# Patient Record
Sex: FEMALE | Race: WHITE | NOT HISPANIC OR LATINO | Employment: OTHER | URBAN - METROPOLITAN AREA
[De-identification: names, ages, dates, MRNs, and addresses within clinical notes are randomized per-mention and may not be internally consistent; named-entity substitution may affect disease eponyms.]

---

## 2017-02-15 ENCOUNTER — TELEPHONE (OUTPATIENT)
Dept: OBSTETRICS AND GYNECOLOGY | Facility: CLINIC | Age: 57
End: 2017-02-15

## 2018-06-14 ENCOUNTER — OFFICE VISIT (OUTPATIENT)
Dept: OBSTETRICS AND GYNECOLOGY | Facility: CLINIC | Age: 58
End: 2018-06-14
Payer: COMMERCIAL

## 2018-06-14 VITALS
WEIGHT: 137.44 LBS | BODY MASS INDEX: 25.95 KG/M2 | DIASTOLIC BLOOD PRESSURE: 84 MMHG | SYSTOLIC BLOOD PRESSURE: 126 MMHG | HEIGHT: 61 IN

## 2018-06-14 DIAGNOSIS — Z01.419 ENCOUNTER FOR GYNECOLOGICAL EXAMINATION: Primary | ICD-10-CM

## 2018-06-14 PROCEDURE — 99999 PR PBB SHADOW E&M-EST. PATIENT-LVL II: CPT | Mod: PBBFAC,,, | Performed by: OBSTETRICS & GYNECOLOGY

## 2018-06-14 PROCEDURE — 99396 PREV VISIT EST AGE 40-64: CPT | Mod: S$GLB,,, | Performed by: OBSTETRICS & GYNECOLOGY

## 2018-06-14 NOTE — PROGRESS NOTES
Subjective:       Patient ID: Franca Calhoun is a 57 y.o. female.    Chief Complaint:  Well Woman (mammo 2017 bmd 2017 )      History of Present Illness  - here for annual. C/o leaking a little urine sometimes. Waits many hours to void. Doesn't wake at night. Tries to remember to do Kegel exercises.    Past Medical History:   Diagnosis Date    Osteoporosis        Past Surgical History:   Procedure Laterality Date    HYSTERECTOMY      vaginal hysterectomy         Current Outpatient Prescriptions:     azelastine (ASTELIN) 137 mcg (0.1 %) nasal spray, PLACE 2 SPRAYS INTRANASALLY TWICE DAILY, Disp: , Rfl: 5    busPIRone (BUSPAR) 10 MG tablet, once a day, Disp: , Rfl:     Ca-soy-blk cohos-timothy-tea-caf (ESTROPLUS MENOPAUSE FORMULA) 90-60 mg Tab, once a day, Disp: , Rfl:     cyanocobalamin (VITAMIN B-12) 100 MCG tablet, once a day, Disp: , Rfl:     law primrose/linoleic/gamoleni (PRIMROSE OIL ORAL), Take by mouth., Disp: , Rfl:     glucosamine-chondroit-vit C-Mn (GLUCOSAMINE CHONDROITIN MAXSTR) 500-400 mg Cap, once a day, Disp: , Rfl:     ipratropium (ATROVENT) 0.06 % nasal spray, 2 SQUIRTS INTO EACH NOSTRIL EVERY 12 HOURS, Disp: , Rfl: 5    multivit-minerals-ferrous fum (MULTI VITAMIN) 9 mg iron/15 mL Liqd, once a day, Disp: , Rfl:     biotin 5 mg Tab, once a day, Disp: , Rfl:     Review of patient's allergies indicates:  No Known Allergies    GYN & OB History  No LMP recorded. Patient has had a hysterectomy.   Date of Last Pap: No result found    OB History    Para Term  AB Living   2 2 2     2   SAB TAB Ectopic Multiple Live Births           2      # Outcome Date GA Lbr Shailesh/2nd Weight Sex Delivery Anes PTL Lv   2 Term 80 40w0d   F Vag-Spont   APOLINAR   1 Term 75 40w0d   M Vag-Spont   APOLINAR          Social History     Social History    Marital status:      Spouse name: N/A    Number of children: N/A    Years of education: N/A     Occupational History    Not on file.  "    Social History Main Topics    Smoking status: Never Smoker    Smokeless tobacco: Never Used    Alcohol use Yes      Comment: rare    Drug use: No    Sexual activity: Yes     Partners: Male     Birth control/ protection: See Surgical Hx     Other Topics Concern    Not on file     Social History Narrative    No narrative on file       Family History   Problem Relation Age of Onset    Diabetes Father     Hypertension Sister     Prostate cancer Paternal Uncle     Hypertension Sister     Breast cancer Neg Hx     Ovarian cancer Neg Hx        Review of Systems  Review of Systems   Respiratory: Negative for shortness of breath.    Cardiovascular: Negative for chest pain and palpitations.   Gastrointestinal: Negative for blood in stool, nausea and vomiting.   Genitourinary:        - see HPI   Skin: Negative for rash and wound.   Allergic/Immunologic: Negative for immunocompromised state.   Neurological: Negative for dizziness and syncope.   Hematological: Negative for adenopathy.   Psychiatric/Behavioral: Negative for behavioral problems.        Objective:     Vitals:    06/14/18 1523   BP: 126/84   Weight: 62.4 kg (137 lb 7.3 oz)   Height: 5' 1" (1.549 m)       Physical Exam:   Constitutional: She is oriented to person, place, and time. She appears well-developed and well-nourished.        Pulmonary/Chest: Right breast exhibits no mass, no nipple discharge, no skin change, no tenderness and no swelling. Left breast exhibits no mass, no nipple discharge, no skin change, no tenderness and no swelling. Breasts are symmetrical.        Abdominal: Soft. She exhibits no distension. There is no tenderness.     Genitourinary: Vagina normal. There is no tenderness or lesion on the right labia. There is no tenderness or lesion on the left labia. Uterus is absent. Right adnexum displays no mass, no tenderness and no fullness. Left adnexum displays no mass, no tenderness and no fullness. No vaginal discharge found. " Cervix exhibits absence.           Musculoskeletal: Moves all extremeties.       Neurological: She is alert and oriented to person, place, and time.     Psychiatric: She has a normal mood and affect.        Assessment/ Plan:          Franca was seen today for well woman.    Diagnoses and all orders for this visit:    Encounter for gynecological examination      - discussed bladder training and double voiding. Likely has poor bladder tone from not voiding often enough. Continue Kegel exercises.    Follow-up in about 1 year (around 6/14/2019) for annual exam.

## 2019-07-26 ENCOUNTER — OFFICE VISIT (OUTPATIENT)
Dept: OBSTETRICS AND GYNECOLOGY | Facility: CLINIC | Age: 59
End: 2019-07-26
Payer: COMMERCIAL

## 2019-07-26 VITALS
SYSTOLIC BLOOD PRESSURE: 110 MMHG | DIASTOLIC BLOOD PRESSURE: 78 MMHG | WEIGHT: 136.69 LBS | BODY MASS INDEX: 25.81 KG/M2 | HEIGHT: 61 IN

## 2019-07-26 DIAGNOSIS — M85.80 OSTEOPENIA, UNSPECIFIED LOCATION: ICD-10-CM

## 2019-07-26 DIAGNOSIS — Z01.419 ENCOUNTER FOR GYNECOLOGICAL EXAMINATION: Primary | ICD-10-CM

## 2019-07-26 DIAGNOSIS — Z12.39 SCREENING FOR BREAST CANCER: ICD-10-CM

## 2019-07-26 PROCEDURE — 99396 PR PREVENTIVE VISIT,EST,40-64: ICD-10-PCS | Mod: S$GLB,,, | Performed by: OBSTETRICS & GYNECOLOGY

## 2019-07-26 PROCEDURE — 99999 PR PBB SHADOW E&M-EST. PATIENT-LVL III: ICD-10-PCS | Mod: PBBFAC,,, | Performed by: OBSTETRICS & GYNECOLOGY

## 2019-07-26 PROCEDURE — 99396 PREV VISIT EST AGE 40-64: CPT | Mod: S$GLB,,, | Performed by: OBSTETRICS & GYNECOLOGY

## 2019-07-26 PROCEDURE — 99999 PR PBB SHADOW E&M-EST. PATIENT-LVL III: CPT | Mod: PBBFAC,,, | Performed by: OBSTETRICS & GYNECOLOGY

## 2019-07-26 RX ORDER — VALACYCLOVIR HYDROCHLORIDE 500 MG/1
500 TABLET, FILM COATED ORAL 2 TIMES DAILY
Refills: 0 | COMMUNITY
Start: 2019-04-18

## 2019-07-26 NOTE — PROGRESS NOTES
Subjective:       Patient ID: Franca Calhoun is a 58 y.o. female.    Chief Complaint:  Well Woman (last vag. pap 2006, Negative  --  last mmg 1-2-19, negative  ([facility]) --  colonoscopy  , Normal (cologard 2019, normal) )      History of Present Illness  - here for annual. Brought labs from PCP including hormone labs. Felt like hot flashes waned then have returned. Has gained some weight. Chases after grandkids. 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    Past Medical History:   Diagnosis Date    Anxiety     Fever blister     H/O mammogram 2019    Normal  ([facility])    Osteoporosis        Past Surgical History:   Procedure Laterality Date    COLONOSCOPY      Normal  (Cologard 2019, Normal)     HYSTERECTOMY      TVH, OVARIES REMAIN         Current Outpatient Medications:     azelastine (ASTELIN) 137 mcg (0.1 %) nasal spray, PLACE 2 SPRAYS INTRANASALLY TWICE DAILY, Disp: , Rfl: 5    B3-azelaic-zinc-B6-copper--5-10-5-1.5 mg Tab, Take by mouth., Disp: , Rfl:     busPIRone (BUSPAR) 10 MG tablet, once a day, Disp: , Rfl:     cyanocobalamin (VITAMIN B-12) 100 MCG tablet, once a day, Disp: , Rfl:     law primrose/linoleic/gamoleni (PRIMROSE OIL ORAL), Take by mouth., Disp: , Rfl:     glucosamine-chondroit-vit C-Mn (GLUCOSAMINE CHONDROITIN MAXSTR) 500-400 mg Cap, once a day, Disp: , Rfl:     ipratropium (ATROVENT) 0.06 % nasal spray, 2 SQUIRTS INTO EACH NOSTRIL EVERY 12 HOURS, Disp: , Rfl: 5    multivit-minerals-ferrous fum (MULTI VITAMIN) 9 mg iron/15 mL Liqd, once a day, Disp: , Rfl:     valACYclovir (VALTREX) 500 MG tablet, Take 500 mg by mouth 2 (two) times daily., Disp: , Rfl: 0    Review of patient's allergies indicates:   Allergen Reactions    Meperidine Nausea And Vomiting       GYN & OB History  No LMP recorded (lmp unknown). Patient has had a hysterectomy.   Date of Last Pap: No result found    OB History    Para Term  AB Living   2 2 2     2    SAB TAB Ectopic Multiple Live Births           2      # Outcome Date GA Lbr Shailesh/2nd Weight Sex Delivery Anes PTL Lv   2 Term 03/16/82 40w0d  3.572 kg (7 lb 14 oz) F Vag-Spont   APOLINAR   1 Term 01/14/77 40w0d  3.629 kg (8 lb) M Vag-Spont   APOLINAR       Social History     Socioeconomic History    Marital status:      Spouse name: Not on file    Number of children: Not on file    Years of education: Not on file    Highest education level: Not on file   Occupational History    Not on file   Social Needs    Financial resource strain: Not on file    Food insecurity:     Worry: Not on file     Inability: Not on file    Transportation needs:     Medical: Not on file     Non-medical: Not on file   Tobacco Use    Smoking status: Never Smoker    Smokeless tobacco: Never Used   Substance and Sexual Activity    Alcohol use: Yes     Comment: Social     Drug use: No    Sexual activity: Yes     Partners: Male     Birth control/protection: Surgical     Comment: :     [facility]  2006   Lifestyle    Physical activity:     Days per week: Not on file     Minutes per session: Not on file    Stress: Not on file   Relationships    Social connections:     Talks on phone: Not on file     Gets together: Not on file     Attends Faith service: Not on file     Active member of club or organization: Not on file     Attends meetings of clubs or organizations: Not on file     Relationship status: Not on file   Other Topics Concern    Not on file   Social History Narrative    Not on file       Family History   Problem Relation Age of Onset    Diabetes Father     Hypertension Sister     Prostate cancer Paternal Uncle     Hypertension Sister     Breast cancer Neg Hx     Ovarian cancer Neg Hx     Colon cancer Neg Hx        Review of Systems  Review of Systems   Respiratory: Negative for shortness of breath.    Cardiovascular: Negative for chest pain and palpitations.   Gastrointestinal: Negative for blood in stool,  "nausea and vomiting.   Genitourinary:        - see HPI   Skin: Negative for rash and wound.   Allergic/Immunologic: Negative for immunocompromised state.   Neurological: Negative for dizziness and syncope.   Hematological: Negative for adenopathy.   Psychiatric/Behavioral: Negative for behavioral problems.        Objective:     Vitals:    07/26/19 1058   BP: 110/78   Weight: 62 kg (136 lb 11 oz)   Height: 5' 1" (1.549 m)       Physical Exam:   Constitutional: She is oriented to person, place, and time. She appears well-developed and well-nourished.        Pulmonary/Chest: Right breast exhibits no mass, no nipple discharge, no skin change, no tenderness and no swelling. Left breast exhibits no mass, no nipple discharge, no skin change, no tenderness and no swelling. Breasts are symmetrical.        Abdominal: Soft. She exhibits no distension. There is no tenderness.     Genitourinary: Vagina normal. There is no tenderness or lesion on the right labia. There is no tenderness or lesion on the left labia. Uterus is absent. Right adnexum displays no mass, no tenderness and no fullness. Left adnexum displays no mass, no tenderness and no fullness. No vaginal discharge found. Cervix exhibits absence.   Genitourinary Comments: Very atrophic           Musculoskeletal: Moves all extremeties.       Neurological: She is alert and oriented to person, place, and time.     Psychiatric: She has a normal mood and affect.        Assessment/ Plan:     Orders Placed This Encounter    Mammo Digital Screening Bilat w/ [facility]    DXA Bone Density Spine And Hip       Franca was seen today for well woman.    Diagnoses and all orders for this visit:    Encounter for gynecological examination    Screening for breast cancer  -     Mammo Digital Screening Bilat w/ [facility]; Future  -     Mammo Digital Screening Bilat w/ [facility]    Osteopenia, unspecified location  -     DXA Bone Density Spine And Hip; Future  -     DXA Bone Density Spine And Hip    - " discussed how there can be causes of hot flashes that aren't hormonal.  - discussed weight loss strategies.  lost 45 lbs on keto and has plateaued. Patient doesn't think keto is healthy and doesn't want to try it. Discussed WW; patient reports has had some success with it in the past and will likely try it again.    Follow up in about 1 year (around 7/26/2020) for annual exam.

## 2019-08-29 DIAGNOSIS — M81.0 OSTEOPOROSIS, UNSPECIFIED OSTEOPOROSIS TYPE, UNSPECIFIED PATHOLOGICAL FRACTURE PRESENCE: Primary | ICD-10-CM

## 2019-08-29 NOTE — TELEPHONE ENCOUNTER
Spoke with patient. Reviewed BMD: spine and hip have worsened. Hip is now in osteoporosis range and spine is in severe osteopenia range. Took Actonel in the past but stopped when had to have a tooth extracted and an implant placed. Discussed trying Prolia. Will order and check insurance.

## 2019-08-30 ENCOUNTER — TELEPHONE (OUTPATIENT)
Dept: PHARMACY | Facility: CLINIC | Age: 59
End: 2019-08-30

## 2019-08-30 NOTE — TELEPHONE ENCOUNTER
LVM for callback to inform patient that [facility] received prescription for Prolia and prior authorization is required.  [facility] will be back in touch once insurance determination is received.

## 2019-09-04 ENCOUNTER — TELEPHONE (OUTPATIENT)
Dept: OBSTETRICS AND GYNECOLOGY | Facility: CLINIC | Age: 59
End: 2019-09-04

## 2019-09-04 DIAGNOSIS — M81.0 OSTEOPOROSIS, UNSPECIFIED OSTEOPOROSIS TYPE, UNSPECIFIED PATHOLOGICAL FRACTURE PRESENCE: Primary | ICD-10-CM

## 2019-09-04 NOTE — TELEPHONE ENCOUNTER
----- Message from Guerda Grant sent at 9/4/2019  1:31 PM CDT -----  Regarding: Prolia  Good afternoon,   [facility] received prescription for Prolia 60 mg/mL. Upon calling the patients insurance company, we have been told that this medication is not covered under the patients pharmacy benefits. This medication is available from your normal clinic buy and bill procedure for the facility administered medication. [facility] is unable to bill medical claims for medications. Please contact [facility] with any questions at [phone]. If you have any questions or concerns please contact us at [facility].     Thank you,   Guerda Grant CPhT  [facility]   Phone: [phone]  Fax:[phone]  [phone]

## 2019-09-19 ENCOUNTER — TELEPHONE (OUTPATIENT)
Dept: OBSTETRICS AND GYNECOLOGY | Facility: CLINIC | Age: 59
End: 2019-09-19

## 2019-09-19 DIAGNOSIS — M81.0 OSTEOPOROSIS, UNSPECIFIED OSTEOPOROSIS TYPE, UNSPECIFIED PATHOLOGICAL FRACTURE PRESENCE: Primary | ICD-10-CM

## 2019-09-19 NOTE — TELEPHONE ENCOUNTER
prolia covered but subject to a $55.00 copay. PA is required. Please get pa and I will contact pt to schedule. [phone] [phone]

## 2019-09-20 NOTE — TELEPHONE ENCOUNTER
Spoke with [employer] and Rosalba is approved from 9/23/2019-9/23/2020 buy and bill in office.  Auth # [id number].  Pt needs labs before injection.  Orders placed.

## 2019-09-23 ENCOUNTER — TELEPHONE (OUTPATIENT)
Dept: OBSTETRICS AND GYNECOLOGY | Facility: CLINIC | Age: 59
End: 2019-09-23

## 2019-09-23 DIAGNOSIS — M81.0 OSTEOPOROSIS, UNSPECIFIED OSTEOPOROSIS TYPE, UNSPECIFIED PATHOLOGICAL FRACTURE PRESENCE: Primary | ICD-10-CM

## 2019-09-23 NOTE — TELEPHONE ENCOUNTER
Sent pt lab orders to [facility]. Pt understands we will contact her to schedule prolia once we receive results.

## 2019-09-26 ENCOUNTER — TELEPHONE (OUTPATIENT)
Dept: OBSTETRICS AND GYNECOLOGY | Facility: CLINIC | Age: 59
End: 2019-09-26

## 2019-09-26 NOTE — TELEPHONE ENCOUNTER
Pt is at [facility] now and her labs weren't sent over.Please fax # [phone] pt is waiting and please call her to let her know they have been faxed.Pt # [phone]

## 2019-10-01 ENCOUNTER — TELEPHONE (OUTPATIENT)
Dept: OBSTETRICS AND GYNECOLOGY | Facility: CLINIC | Age: 59
End: 2019-10-01

## 2019-10-01 NOTE — TELEPHONE ENCOUNTER
Dr. Matamoros pt, was calling to schedule her prolia injection. Was told when she completes labs that nurse would call to schedule. Please call pt and advise, thank you.

## 2019-10-01 NOTE — TELEPHONE ENCOUNTER
Pt had Prolia labs done at [facility] on 9/26. Results in folder    8/24 dexa done at [facility].     Pt received a letter from insurance saying there needs to be an authorization code.    Scheduled 10/10 for prolia

## 2019-10-09 ENCOUNTER — TELEPHONE (OUTPATIENT)
Dept: OBSTETRICS AND GYNECOLOGY | Facility: CLINIC | Age: 59
End: 2019-10-09

## 2019-10-09 NOTE — TELEPHONE ENCOUNTER
Dr Matamoros pt calling, has prolia shot tomorrow but never received results of blood work.Pt wants to make sure she is good to go with shot.Pt # [phone]

## 2019-10-10 ENCOUNTER — CLINICAL SUPPORT (OUTPATIENT)
Dept: OBSTETRICS AND GYNECOLOGY | Facility: CLINIC | Age: 59
End: 2019-10-10
Payer: COMMERCIAL

## 2019-10-10 DIAGNOSIS — M81.0 OSTEOPOROSIS, UNSPECIFIED OSTEOPOROSIS TYPE, UNSPECIFIED PATHOLOGICAL FRACTURE PRESENCE: Primary | ICD-10-CM

## 2019-10-10 PROCEDURE — 96372 PR INJECTION,THERAP/PROPH/DIAG2ST, IM OR SUBCUT: ICD-10-PCS | Mod: S$GLB,,, | Performed by: OBSTETRICS & GYNECOLOGY

## 2019-10-10 PROCEDURE — 96372 THER/PROPH/DIAG INJ SC/IM: CPT | Mod: S$GLB,,, | Performed by: OBSTETRICS & GYNECOLOGY

## 2019-10-10 NOTE — PROGRESS NOTES
Ordering Provider:      Patient here to receive  prolia to the LEFT arm. Tolerated well, no reaction noted. Instructed to wait 15 minutes after administration for monitoring.      Pre pain scale: none     Post pain scale: none\

## 2020-06-12 ENCOUNTER — TELEPHONE (OUTPATIENT)
Dept: OBSTETRICS AND GYNECOLOGY | Facility: CLINIC | Age: 60
End: 2020-06-12

## 2020-06-19 ENCOUNTER — TELEPHONE (OUTPATIENT)
Dept: OBSTETRICS AND GYNECOLOGY | Facility: CLINIC | Age: 60
End: 2020-06-19

## 2020-06-19 NOTE — TELEPHONE ENCOUNTER
Dr. Matamoros pt called saying that she has been dealing with hemorrhoids for a little while. Pt said now her whole vaginal area is on fire, itching, and burning. Pt asked to speak to nurse. Thanks.     Incoming call        Called pt and she has been battling hemorrhoids for the past few months.  She has been using preparation h with no improvement.     She is now experiencing outer itching with redness she thinks could be yeast also.    Appt scheduled.

## 2020-06-23 ENCOUNTER — CLINICAL SUPPORT (OUTPATIENT)
Dept: OBSTETRICS AND GYNECOLOGY | Facility: CLINIC | Age: 60
End: 2020-06-23
Payer: COMMERCIAL

## 2020-06-23 ENCOUNTER — OFFICE VISIT (OUTPATIENT)
Dept: OBSTETRICS AND GYNECOLOGY | Facility: CLINIC | Age: 60
End: 2020-06-23
Attending: OBSTETRICS & GYNECOLOGY
Payer: COMMERCIAL

## 2020-06-23 VITALS
HEIGHT: 61 IN | DIASTOLIC BLOOD PRESSURE: 84 MMHG | SYSTOLIC BLOOD PRESSURE: 132 MMHG | WEIGHT: 142.31 LBS | BODY MASS INDEX: 26.87 KG/M2

## 2020-06-23 DIAGNOSIS — N90.89 VULVAR IRRITATION: Primary | ICD-10-CM

## 2020-06-23 DIAGNOSIS — M81.0 OSTEOPOROSIS, UNSPECIFIED OSTEOPOROSIS TYPE, UNSPECIFIED PATHOLOGICAL FRACTURE PRESENCE: Primary | ICD-10-CM

## 2020-06-23 PROCEDURE — 99999 PR PBB SHADOW E&M-EST. PATIENT-LVL III: CPT | Mod: PBBFAC,,, | Performed by: OBSTETRICS & GYNECOLOGY

## 2020-06-23 PROCEDURE — 3008F PR BODY MASS INDEX (BMI) DOCUMENTED: ICD-10-PCS | Mod: CPTII,S$GLB,, | Performed by: OBSTETRICS & GYNECOLOGY

## 2020-06-23 PROCEDURE — 99213 OFFICE O/P EST LOW 20 MIN: CPT | Mod: 25,S$GLB,, | Performed by: OBSTETRICS & GYNECOLOGY

## 2020-06-23 PROCEDURE — 99999 PR PBB SHADOW E&M-EST. PATIENT-LVL I: ICD-10-PCS | Mod: PBBFAC,,,

## 2020-06-23 PROCEDURE — 96372 PR INJECTION,THERAP/PROPH/DIAG2ST, IM OR SUBCUT: ICD-10-PCS | Mod: S$GLB,,, | Performed by: OBSTETRICS & GYNECOLOGY

## 2020-06-23 PROCEDURE — 96372 THER/PROPH/DIAG INJ SC/IM: CPT | Mod: S$GLB,,, | Performed by: OBSTETRICS & GYNECOLOGY

## 2020-06-23 PROCEDURE — 3008F BODY MASS INDEX DOCD: CPT | Mod: CPTII,S$GLB,, | Performed by: OBSTETRICS & GYNECOLOGY

## 2020-06-23 PROCEDURE — 99999 PR PBB SHADOW E&M-EST. PATIENT-LVL III: ICD-10-PCS | Mod: PBBFAC,,, | Performed by: OBSTETRICS & GYNECOLOGY

## 2020-06-23 PROCEDURE — 99213 PR OFFICE/OUTPT VISIT, EST, LEVL III, 20-29 MIN: ICD-10-PCS | Mod: 25,S$GLB,, | Performed by: OBSTETRICS & GYNECOLOGY

## 2020-06-23 PROCEDURE — 99999 PR PBB SHADOW E&M-EST. PATIENT-LVL I: CPT | Mod: PBBFAC,,,

## 2020-06-23 RX ORDER — CLOTRIMAZOLE AND BETAMETHASONE DIPROPIONATE 10; .64 MG/G; MG/G
CREAM TOPICAL 2 TIMES DAILY
Qty: 45 G | Refills: 0 | Status: SHIPPED | OUTPATIENT
Start: 2020-06-23 | End: 2020-06-30

## 2020-06-23 RX ORDER — HYDROCORTISONE ACETATE PRAMOXINE HCL 2.5; 1 G/100G; G/100G
CREAM TOPICAL 2 TIMES DAILY
Qty: 30 G | Refills: 1 | Status: SHIPPED | OUTPATIENT
Start: 2020-06-23 | End: 2021-12-07 | Stop reason: SDUPTHER

## 2020-06-23 NOTE — PROGRESS NOTES
Subjective:       Patient ID: Franca Calhoun is a 59 y.o. female.    Chief Complaint:  Hemorrhoids (c/o hemorrhoids for past few months- using preperation H with no improvement- also c/o vaginal ithcing and refness )      History of Present Illness  - patient presents with c/o vaginal itching and irritation. She has only a slight discharge. Patient reports that she scratches at night. BMs are painful; denies bleeding. She occasionally has to strain. Patient picks up her 30 lb grandchild frequently.    Past Medical History:   Diagnosis Date    Anxiety     Fever blister     H/O mammogram 01/2019    Normal  ([facility])    Osteoporosis        Past Surgical History:   Procedure Laterality Date    COLONOSCOPY  2012    Normal  (Cologard 7/2019, Normal)     HYSTERECTOMY  2006    TVH, OVARIES REMAIN         Current Outpatient Medications:     azelastine (ASTELIN) 137 mcg (0.1 %) nasal spray, PLACE 2 SPRAYS INTRANASALLY TWICE DAILY, Disp: , Rfl: 5    B3-azelaic-zinc-B6-copper--5-10-5-1.5 mg Tab, Take by mouth., Disp: , Rfl:     busPIRone (BUSPAR) 10 MG tablet, once a day, Disp: , Rfl:     cetirizine HCl (ZYRTEC ORAL), Take by mouth., Disp: , Rfl:     cyanocobalamin (VITAMIN B-12) 100 MCG tablet, once a day, Disp: , Rfl:     denosumab (PROLIA) 60 mg/mL Syrg, Inject 1 mL (60 mg total) into the skin every 6 (six) months., Disp: 2 mL, Rfl: 0    ergocalciferol, vitamin D2, (VITAMIN D ORAL), Take by mouth., Disp: , Rfl:     law primrose/linoleic/gamoleni (PRIMROSE OIL ORAL), Take by mouth., Disp: , Rfl:     glucosamine-chondroit-vit C-Mn (GLUCOSAMINE CHONDROITIN MAXSTR) 500-400 mg Cap, once a day, Disp: , Rfl:     ipratropium (ATROVENT) 0.06 % nasal spray, 2 SQUIRTS INTO EACH NOSTRIL EVERY 12 HOURS, Disp: , Rfl: 5    multivit-minerals-ferrous fum (MULTI VITAMIN) 9 mg iron/15 mL Liqd, once a day, Disp: , Rfl:     valACYclovir (VALTREX) 500 MG tablet, Take 500 mg by mouth 2 (two) times daily., Disp: , Rfl: 0     clotrimazole-betamethasone 1-0.05% (LOTRISONE) cream, Apply topically 2 (two) times daily. for 7 days, Disp: 45 g, Rfl: 0    hydrocortisone-pramoxine (ANALPRAM-HC) 2.5-1 % Crea, Place rectally 2 (two) times daily., Disp: 30 g, Rfl: 1  No current facility-administered medications for this visit.     Review of patient's allergies indicates:   Allergen Reactions    Meperidine Nausea And Vomiting       GYN & OB History  No LMP recorded (lmp unknown). Patient has had a hysterectomy.   Date of Last Pap: No result found    OB History    Para Term  AB Living   2 2 2     2   SAB TAB Ectopic Multiple Live Births           2      # Outcome Date GA Lbr Shailesh/2nd Weight Sex Delivery Anes PTL Lv   2 Term 80 40w0d  3.572 kg (7 lb 14 oz) F Vag-Spont   APOLINAR   1 Term 75 40w0d  3.629 kg (8 lb) M Vag-Spont   APOLINAR       Social History     Socioeconomic History    Marital status:      Spouse name: Not on file    Number of children: Not on file    Years of education: Not on file    Highest education level: Not on file   Occupational History    Not on file   Social Needs    Financial resource strain: Not on file    Food insecurity     Worry: Not on file     Inability: Not on file    Transportation needs     Medical: Not on file     Non-medical: Not on file   Tobacco Use    Smoking status: Never Smoker    Smokeless tobacco: Never Used   Substance and Sexual Activity    Alcohol use: Yes     Comment: Social     Drug use: No    Sexual activity: Yes     Partners: Male     Birth control/protection: Surgical     Comment: :     [facility]     Lifestyle    Physical activity     Days per week: Not on file     Minutes per session: Not on file    Stress: Not on file   Relationships    Social connections     Talks on phone: Not on file     Gets together: Not on file     Attends Gnosticist service: Not on file     Active member of club or organization: Not on file     Attends meetings of clubs or  "organizations: Not on file     Relationship status: Not on file   Other Topics Concern    Not on file   Social History Narrative    Not on file       Family History   Problem Relation Age of Onset    Diabetes Father     Hypertension Sister     Prostate cancer Paternal Uncle     Hypertension Sister     Breast cancer Neg Hx     Ovarian cancer Neg Hx     Colon cancer Neg Hx        Review of Systems  Review of Systems   All other systems reviewed and are negative.       Objective:     Vitals:    06/23/20 1012   BP: 132/84   Weight: 64.5 kg (142 lb 4.9 oz)   Height: 5' 1" (1.549 m)       Physical Exam:   Constitutional: She appears well-developed and well-nourished. She is cooperative. No distress.             Abdominal: Soft. Normal appearance. There is no abdominal tenderness.     Genitourinary:    Vagina normal.   Rectum:      No external hemorrhoid.   There is no rash, tenderness or lesion on the right labia. There is no rash, tenderness or lesion on the left labia.    Genitourinary Comments: Erythema and excoriations.                  Neurological: She is alert.          Assessment/ Plan:     Orders Placed This Encounter    clotrimazole-betamethasone 1-0.05% (LOTRISONE) cream    hydrocortisone-pramoxine (ANALPRAM-HC) 2.5-1 % Crea       Franca was seen today for hemorrhoids.    Diagnoses and all orders for this visit:    Vulvar irritation    Other orders  -     clotrimazole-betamethasone 1-0.05% (LOTRISONE) cream; Apply topically 2 (two) times daily. for 7 days  -     hydrocortisone-pramoxine (ANALPRAM-HC) 2.5-1 % Crea; Place rectally 2 (two) times daily.    - will treat skin irritation with Lotrisone. Advised patient to try to regulate BMs and insert Analpram.    Follow up in about 3 weeks (around 7/14/2020) for follow up.    "

## 2020-06-23 NOTE — PROGRESS NOTES
Pt received Prolia injection as ordered by Dr Matamoros SQ to right arm, pt toelrated well. Pt verbalized understanding of need for prolia for her osteoporosis. Pt denies pain with injection. Pt instructed and observed for 15 min post injection. Next appointment made for 6 months. No reaction noted.

## 2020-08-14 ENCOUNTER — TELEPHONE (OUTPATIENT)
Dept: PHARMACY | Facility: CLINIC | Age: 60
End: 2020-08-14

## 2020-08-14 ENCOUNTER — OFFICE VISIT (OUTPATIENT)
Dept: OBSTETRICS AND GYNECOLOGY | Facility: CLINIC | Age: 60
End: 2020-08-14
Attending: OBSTETRICS & GYNECOLOGY
Payer: COMMERCIAL

## 2020-08-14 VITALS
DIASTOLIC BLOOD PRESSURE: 68 MMHG | TEMPERATURE: 97 F | BODY MASS INDEX: 26.24 KG/M2 | SYSTOLIC BLOOD PRESSURE: 118 MMHG | HEIGHT: 61 IN | WEIGHT: 139 LBS

## 2020-08-14 DIAGNOSIS — M81.0 OSTEOPOROSIS, UNSPECIFIED OSTEOPOROSIS TYPE, UNSPECIFIED PATHOLOGICAL FRACTURE PRESENCE: ICD-10-CM

## 2020-08-14 DIAGNOSIS — Z01.419 ENCOUNTER FOR GYNECOLOGICAL EXAMINATION: Primary | ICD-10-CM

## 2020-08-14 DIAGNOSIS — Z12.31 ENCOUNTER FOR SCREENING MAMMOGRAM FOR BREAST CANCER: ICD-10-CM

## 2020-08-14 PROCEDURE — 99999 PR PBB SHADOW E&M-EST. PATIENT-LVL IV: CPT | Mod: PBBFAC,,, | Performed by: OBSTETRICS & GYNECOLOGY

## 2020-08-14 PROCEDURE — 99999 PR PBB SHADOW E&M-EST. PATIENT-LVL IV: ICD-10-PCS | Mod: PBBFAC,,, | Performed by: OBSTETRICS & GYNECOLOGY

## 2020-08-14 PROCEDURE — 99396 PR PREVENTIVE VISIT,EST,40-64: ICD-10-PCS | Mod: S$GLB,,, | Performed by: OBSTETRICS & GYNECOLOGY

## 2020-08-14 PROCEDURE — 99396 PREV VISIT EST AGE 40-64: CPT | Mod: S$GLB,,, | Performed by: OBSTETRICS & GYNECOLOGY

## 2020-08-14 PROCEDURE — 3008F BODY MASS INDEX DOCD: CPT | Mod: CPTII,S$GLB,, | Performed by: OBSTETRICS & GYNECOLOGY

## 2020-08-14 PROCEDURE — 3008F PR BODY MASS INDEX (BMI) DOCUMENTED: ICD-10-PCS | Mod: CPTII,S$GLB,, | Performed by: OBSTETRICS & GYNECOLOGY

## 2020-08-18 ENCOUNTER — TELEPHONE (OUTPATIENT)
Dept: OBSTETRICS AND GYNECOLOGY | Facility: CLINIC | Age: 60
End: 2020-08-18

## 2020-08-18 RX ORDER — BUSPIRONE HYDROCHLORIDE 10 MG/1
10 TABLET ORAL
Qty: 30 TABLET | Refills: 1 | Status: SHIPPED | OUTPATIENT
Start: 2020-08-18 | End: 2020-09-15

## 2020-08-18 NOTE — TELEPHONE ENCOUNTER
Neelam Downing S Staff 27 minutes ago (10:53 AM)     Dr Matamoros pt calling with the mg on medication she needs sent in busPIRone (BUSPAR) 10 MG tablet.pharmacy on file.    Routing comment       Franca Calhoun [phone]  Neelam Torrez 28 minutes ago (10:52 AM)     Pt was told to call back with mg information on her Buspar. The Rx is for 10 mg she only takes it as needed and needs a new Rx. Pharmacy and allergies are UTD

## 2020-09-04 ENCOUNTER — TELEPHONE (OUTPATIENT)
Dept: OBSTETRICS AND GYNECOLOGY | Facility: CLINIC | Age: 60
End: 2020-09-04

## 2020-09-24 RX ORDER — BUSPIRONE HYDROCHLORIDE 10 MG/1
10 TABLET ORAL DAILY PRN
Qty: 90 TABLET | Refills: 2 | Status: SHIPPED | OUTPATIENT
Start: 2020-09-24 | End: 2021-12-07 | Stop reason: SDUPTHER

## 2020-09-24 NOTE — TELEPHONE ENCOUNTER
Livia Downing S Staff 1 hour ago (12:27 PM)     Pharm faxed request for 90 day supply.    Routing comment       [id number] [place] [facility] [address] [phone]  Livia Nguyen 1 hour ago (12:26 PM)     Pt had annual on 8/14/20 please review and sign

## 2020-12-31 ENCOUNTER — TELEPHONE (OUTPATIENT)
Dept: OBSTETRICS AND GYNECOLOGY | Facility: CLINIC | Age: 60
End: 2020-12-31

## 2020-12-31 ENCOUNTER — SPECIALTY PHARMACY (OUTPATIENT)
Dept: PHARMACY | Facility: CLINIC | Age: 60
End: 2020-12-31

## 2020-12-31 LAB
25(OH)D3 SERPL-MCNC: 23 NG/ML (ref 30–100)
ALBUMIN SERPL-MCNC: 4.2 G/DL (ref 3.6–5.1)
ALBUMIN/GLOB SERPL: 2 (CALC) (ref 1–2.5)
ALP SERPL-CCNC: 51 U/L (ref 37–153)
ALT SERPL-CCNC: 20 U/L (ref 6–29)
AST SERPL-CCNC: 24 U/L (ref 10–35)
BILIRUB SERPL-MCNC: 0.3 MG/DL (ref 0.2–1.2)
BUN SERPL-MCNC: 12 MG/DL (ref 7–25)
BUN/CREAT SERPL: NORMAL (CALC) (ref 6–22)
CALCIUM SERPL-MCNC: 9.8 MG/DL (ref 8.6–10.4)
CHLORIDE SERPL-SCNC: 101 MMOL/L (ref 98–110)
CO2 SERPL-SCNC: 29 MMOL/L (ref 20–32)
CREAT SERPL-MCNC: 0.71 MG/DL (ref 0.5–0.99)
GFRSERPLBLD MDRD-ARVRAT: 91 ML/MIN/1.73M2
GLOBULIN SER CALC-MCNC: 2.1 G/DL (CALC) (ref 1.9–3.7)
GLUCOSE SERPL-MCNC: 110 MG/DL (ref 65–139)
MAGNESIUM SERPL-MCNC: 2.1 MG/DL (ref 1.5–2.5)
PHOSPHATE SERPL-MCNC: 3.9 MG/DL (ref 2.5–4.5)
POTASSIUM SERPL-SCNC: 4.8 MMOL/L (ref 3.5–5.3)
PROT SERPL-MCNC: 6.3 G/DL (ref 6.1–8.1)
SODIUM SERPL-SCNC: 137 MMOL/L (ref 135–146)

## 2021-01-12 ENCOUNTER — TELEPHONE (OUTPATIENT)
Dept: OBSTETRICS AND GYNECOLOGY | Facility: CLINIC | Age: 61
End: 2021-01-12

## 2021-01-14 ENCOUNTER — TELEPHONE (OUTPATIENT)
Dept: OBSTETRICS AND GYNECOLOGY | Facility: CLINIC | Age: 61
End: 2021-01-14

## 2021-01-15 ENCOUNTER — CLINICAL SUPPORT (OUTPATIENT)
Dept: OBSTETRICS AND GYNECOLOGY | Facility: CLINIC | Age: 61
End: 2021-01-15
Payer: COMMERCIAL

## 2021-01-15 DIAGNOSIS — M81.0 OSTEOPOROSIS, UNSPECIFIED OSTEOPOROSIS TYPE, UNSPECIFIED PATHOLOGICAL FRACTURE PRESENCE: Primary | ICD-10-CM

## 2021-01-15 PROCEDURE — 96372 THER/PROPH/DIAG INJ SC/IM: CPT | Mod: S$GLB,,, | Performed by: OBSTETRICS & GYNECOLOGY

## 2021-01-15 PROCEDURE — 96372 PR INJECTION,THERAP/PROPH/DIAG2ST, IM OR SUBCUT: ICD-10-PCS | Mod: S$GLB,,, | Performed by: OBSTETRICS & GYNECOLOGY

## 2021-07-06 ENCOUNTER — TELEPHONE (OUTPATIENT)
Dept: OBSTETRICS AND GYNECOLOGY | Facility: CLINIC | Age: 61
End: 2021-07-06

## 2021-07-07 ENCOUNTER — TELEPHONE (OUTPATIENT)
Dept: OBSTETRICS AND GYNECOLOGY | Facility: CLINIC | Age: 61
End: 2021-07-07

## 2021-07-07 PROBLEM — M81.0 AGE-RELATED OSTEOPOROSIS WITHOUT CURRENT PATHOLOGICAL FRACTURE: Status: ACTIVE | Noted: 2021-07-07

## 2021-07-15 ENCOUNTER — TELEPHONE (OUTPATIENT)
Dept: OBSTETRICS AND GYNECOLOGY | Facility: CLINIC | Age: 61
End: 2021-07-15

## 2021-07-29 ENCOUNTER — INFUSION (OUTPATIENT)
Dept: INFUSION THERAPY | Facility: HOSPITAL | Age: 61
End: 2021-07-29
Payer: COMMERCIAL

## 2021-07-29 DIAGNOSIS — M81.0 AGE-RELATED OSTEOPOROSIS WITHOUT CURRENT PATHOLOGICAL FRACTURE: Primary | ICD-10-CM

## 2021-07-29 PROCEDURE — 96372 THER/PROPH/DIAG INJ SC/IM: CPT

## 2021-07-29 PROCEDURE — 63600175 PHARM REV CODE 636 W HCPCS: Mod: JG | Performed by: OBSTETRICS & GYNECOLOGY

## 2021-07-29 RX ADMIN — DENOSUMAB 60 MG: 60 INJECTION SUBCUTANEOUS at 09:07

## 2021-08-26 ENCOUNTER — OFFICE VISIT (OUTPATIENT)
Dept: OBSTETRICS AND GYNECOLOGY | Facility: CLINIC | Age: 61
End: 2021-08-26
Attending: OBSTETRICS & GYNECOLOGY
Payer: COMMERCIAL

## 2021-08-26 VITALS
BODY MASS INDEX: 26.3 KG/M2 | WEIGHT: 139.31 LBS | SYSTOLIC BLOOD PRESSURE: 136 MMHG | DIASTOLIC BLOOD PRESSURE: 84 MMHG | HEIGHT: 61 IN

## 2021-08-26 DIAGNOSIS — Z01.419 ENCOUNTER FOR GYNECOLOGICAL EXAMINATION: Primary | ICD-10-CM

## 2021-08-26 PROCEDURE — 99396 PR PREVENTIVE VISIT,EST,40-64: ICD-10-PCS | Mod: S$GLB,,, | Performed by: OBSTETRICS & GYNECOLOGY

## 2021-08-26 PROCEDURE — 99999 PR PBB SHADOW E&M-EST. PATIENT-LVL III: ICD-10-PCS | Mod: PBBFAC,,, | Performed by: OBSTETRICS & GYNECOLOGY

## 2021-08-26 PROCEDURE — 3008F BODY MASS INDEX DOCD: CPT | Mod: CPTII,S$GLB,, | Performed by: OBSTETRICS & GYNECOLOGY

## 2021-08-26 PROCEDURE — 3075F SYST BP GE 130 - 139MM HG: CPT | Mod: CPTII,S$GLB,, | Performed by: OBSTETRICS & GYNECOLOGY

## 2021-08-26 PROCEDURE — 99999 PR PBB SHADOW E&M-EST. PATIENT-LVL III: CPT | Mod: PBBFAC,,, | Performed by: OBSTETRICS & GYNECOLOGY

## 2021-08-26 PROCEDURE — 1159F MED LIST DOCD IN RCRD: CPT | Mod: CPTII,S$GLB,, | Performed by: OBSTETRICS & GYNECOLOGY

## 2021-08-26 PROCEDURE — 1126F AMNT PAIN NOTED NONE PRSNT: CPT | Mod: CPTII,S$GLB,, | Performed by: OBSTETRICS & GYNECOLOGY

## 2021-08-26 PROCEDURE — 1126F PR PAIN SEVERITY QUANTIFIED, NO PAIN PRESENT: ICD-10-PCS | Mod: CPTII,S$GLB,, | Performed by: OBSTETRICS & GYNECOLOGY

## 2021-08-26 PROCEDURE — 3079F PR MOST RECENT DIASTOLIC BLOOD PRESSURE 80-89 MM HG: ICD-10-PCS | Mod: CPTII,S$GLB,, | Performed by: OBSTETRICS & GYNECOLOGY

## 2021-08-26 PROCEDURE — 99396 PREV VISIT EST AGE 40-64: CPT | Mod: S$GLB,,, | Performed by: OBSTETRICS & GYNECOLOGY

## 2021-08-26 PROCEDURE — 1160F RVW MEDS BY RX/DR IN RCRD: CPT | Mod: CPTII,S$GLB,, | Performed by: OBSTETRICS & GYNECOLOGY

## 2021-08-26 PROCEDURE — 3075F PR MOST RECENT SYSTOLIC BLOOD PRESS GE 130-139MM HG: ICD-10-PCS | Mod: CPTII,S$GLB,, | Performed by: OBSTETRICS & GYNECOLOGY

## 2021-08-26 PROCEDURE — 1159F PR MEDICATION LIST DOCUMENTED IN MEDICAL RECORD: ICD-10-PCS | Mod: CPTII,S$GLB,, | Performed by: OBSTETRICS & GYNECOLOGY

## 2021-08-26 PROCEDURE — 3008F PR BODY MASS INDEX (BMI) DOCUMENTED: ICD-10-PCS | Mod: CPTII,S$GLB,, | Performed by: OBSTETRICS & GYNECOLOGY

## 2021-08-26 PROCEDURE — 1160F PR REVIEW ALL MEDS BY PRESCRIBER/CLIN PHARMACIST DOCUMENTED: ICD-10-PCS | Mod: CPTII,S$GLB,, | Performed by: OBSTETRICS & GYNECOLOGY

## 2021-08-26 PROCEDURE — 3079F DIAST BP 80-89 MM HG: CPT | Mod: CPTII,S$GLB,, | Performed by: OBSTETRICS & GYNECOLOGY

## 2021-09-14 ENCOUNTER — TELEPHONE (OUTPATIENT)
Dept: OBSTETRICS AND GYNECOLOGY | Facility: CLINIC | Age: 61
End: 2021-09-14

## 2021-10-04 ENCOUNTER — TELEPHONE (OUTPATIENT)
Dept: OBSTETRICS AND GYNECOLOGY | Facility: CLINIC | Age: 61
End: 2021-10-04
Payer: COMMERCIAL

## 2021-10-12 ENCOUNTER — TELEPHONE (OUTPATIENT)
Dept: OBSTETRICS AND GYNECOLOGY | Facility: CLINIC | Age: 61
End: 2021-10-12

## 2021-11-01 NOTE — TELEPHONE ENCOUNTER
Missy Downing S Staff 1 hour ago (1:10 PM)     [facility] calling regarding RX directions on pt's buspar medication. Said they need specific directions per ins. Please contact pharmacy and advise, thank you.     Routing comment       [id number] - [place] - [address] [phone]  Missy Hall 1 hour ago (1:09 PM     Pharmacy wanted to clarify more specific directions due to insurance. Advised pt only takes as need for anxiety. Pharmacy is able to put take one tablet daily as needed for anxiety.    English

## 2021-12-06 ENCOUNTER — TELEPHONE (OUTPATIENT)
Dept: OBSTETRICS AND GYNECOLOGY | Facility: CLINIC | Age: 61
End: 2021-12-06
Payer: COMMERCIAL

## 2021-12-06 DIAGNOSIS — R10.2 PELVIC PAIN: Primary | ICD-10-CM

## 2021-12-07 ENCOUNTER — OFFICE VISIT (OUTPATIENT)
Dept: OBSTETRICS AND GYNECOLOGY | Facility: CLINIC | Age: 61
End: 2021-12-07
Attending: OBSTETRICS & GYNECOLOGY
Payer: COMMERCIAL

## 2021-12-07 VITALS — WEIGHT: 136.69 LBS | BODY MASS INDEX: 25.81 KG/M2 | HEIGHT: 61 IN

## 2021-12-07 DIAGNOSIS — R10.2 PELVIC PAIN IN FEMALE: Primary | ICD-10-CM

## 2021-12-07 PROCEDURE — 99213 PR OFFICE/OUTPT VISIT, EST, LEVL III, 20-29 MIN: ICD-10-PCS | Mod: S$GLB,,, | Performed by: OBSTETRICS & GYNECOLOGY

## 2021-12-07 PROCEDURE — 99213 OFFICE O/P EST LOW 20 MIN: CPT | Mod: S$GLB,,, | Performed by: OBSTETRICS & GYNECOLOGY

## 2021-12-07 PROCEDURE — 99999 PR PBB SHADOW E&M-EST. PATIENT-LVL III: CPT | Mod: PBBFAC,,, | Performed by: OBSTETRICS & GYNECOLOGY

## 2021-12-07 PROCEDURE — 99999 PR PBB SHADOW E&M-EST. PATIENT-LVL III: ICD-10-PCS | Mod: PBBFAC,,, | Performed by: OBSTETRICS & GYNECOLOGY

## 2021-12-07 RX ORDER — HYDROCORTISONE ACETATE PRAMOXINE HCL 2.5; 1 G/100G; G/100G
CREAM TOPICAL 2 TIMES DAILY
Qty: 30 G | Refills: 1 | Status: SHIPPED | OUTPATIENT
Start: 2021-12-07 | End: 2022-09-16

## 2021-12-07 RX ORDER — BUSPIRONE HYDROCHLORIDE 10 MG/1
10 TABLET ORAL DAILY PRN
Qty: 90 TABLET | Refills: 3 | Status: SHIPPED | OUTPATIENT
Start: 2021-12-07

## 2022-06-13 ENCOUNTER — TELEPHONE (OUTPATIENT)
Dept: OBSTETRICS AND GYNECOLOGY | Facility: CLINIC | Age: 62
End: 2022-06-13
Payer: COMMERCIAL

## 2022-06-13 DIAGNOSIS — M81.0 OSTEOPOROSIS, UNSPECIFIED OSTEOPOROSIS TYPE, UNSPECIFIED PATHOLOGICAL FRACTURE PRESENCE: Primary | ICD-10-CM

## 2022-06-13 NOTE — TELEPHONE ENCOUNTER
Spoke with pt. Wants to set up prolia. I have put in lab orders and dexa orders for pt to do. All questions asked. Pt gave verbal understanding.

## 2022-06-15 ENCOUNTER — SPECIALTY PHARMACY (OUTPATIENT)
Dept: PHARMACY | Facility: CLINIC | Age: 62
End: 2022-06-15
Payer: COMMERCIAL

## 2022-06-20 DIAGNOSIS — M81.0 OSTEOPOROSIS, UNSPECIFIED OSTEOPOROSIS TYPE, UNSPECIFIED PATHOLOGICAL FRACTURE PRESENCE: ICD-10-CM

## 2022-07-05 ENCOUNTER — TELEPHONE (OUTPATIENT)
Dept: OBSTETRICS AND GYNECOLOGY | Facility: CLINIC | Age: 62
End: 2022-07-05
Payer: COMMERCIAL

## 2022-07-05 NOTE — TELEPHONE ENCOUNTER
Spoke with pt and let her know we have no received any results from [facility] or [facility] in regards to labs and dexa scan. I let pt know we should have everything by the end of the week. Pt gave verbal understanding.

## 2022-07-05 NOTE — TELEPHONE ENCOUNTER
Spoke with patient. OK for Prolia. Please check on where she should get her Prolia. (Had to pay extra for infusion center last time.)

## 2022-07-06 ENCOUNTER — TELEPHONE (OUTPATIENT)
Dept: OBSTETRICS AND GYNECOLOGY | Facility: CLINIC | Age: 62
End: 2022-07-06

## 2022-07-11 ENCOUNTER — INFUSION (OUTPATIENT)
Dept: INFUSION THERAPY | Facility: HOSPITAL | Age: 62
End: 2022-07-11
Payer: COMMERCIAL

## 2022-07-11 DIAGNOSIS — M81.0 AGE-RELATED OSTEOPOROSIS WITHOUT CURRENT PATHOLOGICAL FRACTURE: Primary | ICD-10-CM

## 2022-07-11 PROCEDURE — 63600175 PHARM REV CODE 636 W HCPCS: Mod: JG | Performed by: OBSTETRICS & GYNECOLOGY

## 2022-07-11 PROCEDURE — 96372 THER/PROPH/DIAG INJ SC/IM: CPT

## 2022-07-11 RX ADMIN — DENOSUMAB 60 MG: 60 INJECTION SUBCUTANEOUS at 01:07

## 2022-07-11 NOTE — NURSING
Pt arrived for Prolia.  Pt states had labs from [facility] and bone density done.  Ok to give per dr. Matamoros.  Pt tolerated injection SQ to GEO. Denny.  Pt discharged to home.

## 2022-07-22 ENCOUNTER — OFFICE VISIT (OUTPATIENT)
Dept: INTERNAL MEDICINE | Facility: CLINIC | Age: 62
End: 2022-07-22
Payer: COMMERCIAL

## 2022-07-22 VITALS
DIASTOLIC BLOOD PRESSURE: 80 MMHG | HEART RATE: 73 BPM | OXYGEN SATURATION: 98 % | SYSTOLIC BLOOD PRESSURE: 122 MMHG | HEIGHT: 61 IN | TEMPERATURE: 98 F | BODY MASS INDEX: 26.71 KG/M2 | WEIGHT: 141.44 LBS

## 2022-07-22 DIAGNOSIS — Z11.59 SCREENING FOR VIRAL DISEASE: ICD-10-CM

## 2022-07-22 DIAGNOSIS — Z12.31 SCREENING MAMMOGRAM, ENCOUNTER FOR: ICD-10-CM

## 2022-07-22 DIAGNOSIS — Z00.00 ANNUAL PHYSICAL EXAM: Primary | ICD-10-CM

## 2022-07-22 DIAGNOSIS — R00.2 PALPITATIONS: ICD-10-CM

## 2022-07-22 DIAGNOSIS — Z12.11 SCREEN FOR COLON CANCER: ICD-10-CM

## 2022-07-22 DIAGNOSIS — Z11.4 SCREENING FOR HIV (HUMAN IMMUNODEFICIENCY VIRUS): ICD-10-CM

## 2022-07-22 PROCEDURE — 93005 ELECTROCARDIOGRAM TRACING: CPT | Mod: S$GLB,,, | Performed by: INTERNAL MEDICINE

## 2022-07-22 PROCEDURE — 1160F PR REVIEW ALL MEDS BY PRESCRIBER/CLIN PHARMACIST DOCUMENTED: ICD-10-PCS | Mod: CPTII,S$GLB,, | Performed by: INTERNAL MEDICINE

## 2022-07-22 PROCEDURE — 93005 EKG 12-LEAD: ICD-10-PCS | Mod: S$GLB,,, | Performed by: INTERNAL MEDICINE

## 2022-07-22 PROCEDURE — 3008F PR BODY MASS INDEX (BMI) DOCUMENTED: ICD-10-PCS | Mod: CPTII,S$GLB,, | Performed by: INTERNAL MEDICINE

## 2022-07-22 PROCEDURE — 1160F RVW MEDS BY RX/DR IN RCRD: CPT | Mod: CPTII,S$GLB,, | Performed by: INTERNAL MEDICINE

## 2022-07-22 PROCEDURE — 3008F BODY MASS INDEX DOCD: CPT | Mod: CPTII,S$GLB,, | Performed by: INTERNAL MEDICINE

## 2022-07-22 PROCEDURE — 3079F PR MOST RECENT DIASTOLIC BLOOD PRESSURE 80-89 MM HG: ICD-10-PCS | Mod: CPTII,S$GLB,, | Performed by: INTERNAL MEDICINE

## 2022-07-22 PROCEDURE — 3079F DIAST BP 80-89 MM HG: CPT | Mod: CPTII,S$GLB,, | Performed by: INTERNAL MEDICINE

## 2022-07-22 PROCEDURE — 3074F PR MOST RECENT SYSTOLIC BLOOD PRESSURE < 130 MM HG: ICD-10-PCS | Mod: CPTII,S$GLB,, | Performed by: INTERNAL MEDICINE

## 2022-07-22 PROCEDURE — 1159F PR MEDICATION LIST DOCUMENTED IN MEDICAL RECORD: ICD-10-PCS | Mod: CPTII,S$GLB,, | Performed by: INTERNAL MEDICINE

## 2022-07-22 PROCEDURE — 3074F SYST BP LT 130 MM HG: CPT | Mod: CPTII,S$GLB,, | Performed by: INTERNAL MEDICINE

## 2022-07-22 PROCEDURE — 99396 PREV VISIT EST AGE 40-64: CPT | Mod: S$GLB,,, | Performed by: INTERNAL MEDICINE

## 2022-07-22 PROCEDURE — 99396 PR PREVENTIVE VISIT,EST,40-64: ICD-10-PCS | Mod: S$GLB,,, | Performed by: INTERNAL MEDICINE

## 2022-07-22 PROCEDURE — 1159F MED LIST DOCD IN RCRD: CPT | Mod: CPTII,S$GLB,, | Performed by: INTERNAL MEDICINE

## 2022-07-22 PROCEDURE — 99999 PR PBB SHADOW E&M-EST. PATIENT-LVL V: CPT | Mod: PBBFAC,,, | Performed by: INTERNAL MEDICINE

## 2022-07-22 PROCEDURE — 93010 EKG 12-LEAD: ICD-10-PCS | Mod: S$GLB,,, | Performed by: INTERNAL MEDICINE

## 2022-07-22 PROCEDURE — 93010 ELECTROCARDIOGRAM REPORT: CPT | Mod: S$GLB,,, | Performed by: INTERNAL MEDICINE

## 2022-07-22 PROCEDURE — 99999 PR PBB SHADOW E&M-EST. PATIENT-LVL V: ICD-10-PCS | Mod: PBBFAC,,, | Performed by: INTERNAL MEDICINE

## 2022-07-22 RX ORDER — EPINEPHRINE 0.3 MG/.3ML
INJECTION SUBCUTANEOUS
COMMUNITY
Start: 2022-05-02

## 2022-07-22 NOTE — PROGRESS NOTES
[facility] Note    Chief Complaint      Chief Complaint   Patient presents with    Annual Exam       History of Present Illness      Franca Calhoun is a 61 y.o. female with chronic conditions of anxiety, osteoporosis who presents today for: establish care and annual preventative visit.  Has episodes of palpitations that can be significant and associated with shortness of breath.  Has been able to identify antihistamine-nasal decongestion as a likely trigger.  Diet: Prepares own food mostly.  Has had success with weight watchers in the past.  Drinks plenty water.    Exercise: will increase walking.    Denies drinking and driving, drinking more than 4 drinks on occasion, drug use.     Flu shot declines.  TdAP 2017.  COVID vaccine UTD.  Shingrix discussed.  Pneumonia vaccine due age 63.  Mammogram due.  PAP smear completed s/p hysterectomy.    Cologuard 2019, negative.  Has had cscope with Dr. Mcneill in the past which was normal.     Past Medical History:  Past Medical History:   Diagnosis Date    Age-related osteoporosis without current pathological fracture 7/7/2021    Anxiety     Fever blister     H/O mammogram 01/2019    Normal  (DIS)    Osteoporosis        Past Surgical History:   has a past surgical history that includes Hysterectomy (2006); Colonoscopy (2012); and Eye surgery (2/2000).    Family History:  family history includes Arthritis in her mother; Diabetes in her father; Hypertension in her sister and sister; Prostate cancer in her paternal uncle; Stroke in her sister and sister.     Social History:  Social History     Tobacco Use    Smoking status: Never Smoker    Smokeless tobacco: Never Used   Substance Use Topics    Alcohol use: Yes     Comment: Social     Drug use: No       I personally reviewed all past medical, surgical, social and family history.    Review of Systems   Constitutional: Negative for chills, fever and malaise/fatigue.   Respiratory: Negative for shortness  of breath.    Cardiovascular: Negative for chest pain.   Gastrointestinal: Negative for constipation, diarrhea, nausea and vomiting.   Skin: Negative for rash.   Neurological: Negative for weakness.   All other systems reviewed and are negative.       Medications:  Outpatient Encounter Medications as of 7/22/2022   Medication Sig Note Dispense Refill    azelastine (ASTELIN) 137 mcg (0.1 %) nasal spray PLACE 2 SPRAYS INTRANASALLY TWICE DAILY 11/10/2016: Received from: External Pharmacy  5    BIOTIN ORAL Take by mouth.       busPIRone (BUSPAR) 10 MG tablet Take 1 tablet (10 mg total) by mouth daily as needed.  90 tablet 3    cetirizine HCl (ZYRTEC ORAL) Take by mouth.       cyanocobalamin (VITAMIN B-12) 100 MCG tablet once a day 11/10/2016: Received from: [facility]      denosumab (PROLIA) 60 mg/mL Syrg Inject 1 mL (60 mg total) into the skin every 6 (six) months.  2 mL 1    EPINEPHrine (EPIPEN) 0.3 mg/0.3 mL AtIn Inject into the muscle.       ergocalciferol, vitamin D2, (VITAMIN D ORAL) Take by mouth.       law primrose/linoleic/gamoleni (PRIMROSE OIL ORAL) Take by mouth.       glucosamine-chondroit-vit C-Mn 500-400 mg capsule once a day 11/10/2016: Received from: [facility]      hydrocortisone-pramoxine (ANALPRAM-HC) 2.5-1 % Crea Place rectally 2 (two) times daily.  30 g 1    ipratropium (ATROVENT) 0.06 % nasal spray 2 SQUIRTS INTO EACH NOSTRIL EVERY 12 HOURS 11/10/2016: Received from: External Pharmacy  5    multivit-min-ferrous fumarate 9 mg iron/15 mL Liqd once a day 11/10/2016: Received from: [facility]      valACYclovir (VALTREX) 500 MG tablet Take 500 mg by mouth 2 (two) times daily. 7/26/2019: FOR FEVER BLISTERS  0     No facility-administered encounter medications on file as of 7/22/2022.       Allergies:  Review of patient's allergies indicates:  "  Allergen Reactions    Meperidine Nausea And Vomiting       Health Maintenance:  Immunization History   Administered Date(s) Administered    COVID-19, MRNA, LN-S, PF (Pfizer) (Purple Cap) 02/19/2021, 03/12/2021    DTaP 07/10/2017    Influenza 11/08/2013, 11/06/2018    Influenza - Quadrivalent 11/06/2018    Tdap 07/10/2017      Health Maintenance   Topic Date Due    Hepatitis C Screening  Never done    Lipid Panel  Never done    Mammogram  07/08/2022    TETANUS VACCINE  07/10/2027        Physical Exam      Vital Signs  Temp: 97.9 °F (36.6 °C)  Temp src: Oral  Pulse: 73  SpO2: 98 %  BP: 122/80  BP Location: Left arm  Patient Position: Sitting  Pain Score: 0-No pain  Height and Weight  Height: 5' 1" (154.9 cm)  Weight: 64.1 kg (141 lb 6.8 oz)  BSA (Calculated - sq m): 1.66 sq meters  BMI (Calculated): 26.7  Weight in (lb) to have BMI = 25: 132]    Physical Exam  Vitals reviewed.   Constitutional:       Appearance: She is well-developed.   HENT:      Head: Normocephalic and atraumatic.      Right Ear: External ear normal.      Left Ear: External ear normal.   Cardiovascular:      Rate and Rhythm: Normal rate and regular rhythm.      Heart sounds: Normal heart sounds. No murmur heard.  Pulmonary:      Effort: Pulmonary effort is normal.      Breath sounds: Normal breath sounds. No wheezing or rales.   Abdominal:      General: Bowel sounds are normal. There is no distension.      Palpations: Abdomen is soft.      Tenderness: There is no abdominal tenderness.          Laboratory:  CBC:      CMP:  Recent Labs   Lab 12/30/20  1157   Glucose 110   Calcium 9.8   Albumin 4.2   Total Protein 6.3   Sodium 137   Potassium 4.8   CO2 29   Chloride 101   BUN 12   ALT 20   AST 24   Total Bilirubin 0.3     URINALYSIS:       LIPIDS:      TSH:      A1C:        Assessment/Plan     Franca Calhoun is a 61 y.o.female with:    1. Annual physical exam  - CBC Auto Differential; Future  - Comprehensive Metabolic Panel; Future  - " Lipid Panel; Future  - TSH; Future  - T4, Free; Future  - Hepatitis C Antibody; Future  - HIV 1/2 Ag/Ab (4th Gen); Future  2. Screening for viral disease  - Hepatitis C Antibody; Future  3. Screening for HIV (human immunodeficiency virus)  - HIV 1/2 Ag/Ab (4th Gen); Future  4. Screen for colon cancer  - Cologuard Screening (Multitarget Stool DNA); Future  - Cologuard Screening (Multitarget Stool DNA)  5. Screening mammogram, encounter for  - Mammo Digital Screening Bilat w/ [facility]; Future  - Mammo Digital Screening Bilat w/ [facility]  Discussed diet and exercise, vaccines and cancer screening, risk factors.  Screening labs ordered.      6. Palpitations  EKG in office with normal sinus rhythm, normal intervals, no ST segment changes or TWI.  Likely benign PVCs.  Will check 24 holter.      Chronic conditions status updated as per HPI.  Other than changes above, cont current medications and maintain follow up with specialists.  Follow up in about 1 year (around 7/22/2023) for Annual preventative visit.    Future Appointments   Date Time Provider Department Center   11/10/2022  9:30 AM Salina Matamoros MD [facility]       Jeff Toure MD  [facility]

## 2022-07-27 ENCOUNTER — TELEPHONE (OUTPATIENT)
Dept: INTERNAL MEDICINE | Facility: CLINIC | Age: 62
End: 2022-07-27
Payer: COMMERCIAL

## 2022-07-27 NOTE — TELEPHONE ENCOUNTER
----- Message from Katelynn Garcia sent at 7/27/2022  9:10 AM CDT -----  Contact: Patient [phone]  Good Morning  Patient is at [facility] and would like to know if her lab orders could be please fax to [facility] [id number], [place] [address]    Thank you

## 2022-07-29 LAB
ALBUMIN SERPL-MCNC: 4.4 G/DL (ref 3.6–5.1)
ALBUMIN/GLOB SERPL: 2.3 (CALC) (ref 1–2.5)
ALP SERPL-CCNC: 61 U/L (ref 37–153)
ALT SERPL-CCNC: 26 U/L (ref 6–29)
AST SERPL-CCNC: 21 U/L (ref 10–35)
BASOPHILS # BLD AUTO: 70 CELLS/UL (ref 0–200)
BASOPHILS NFR BLD AUTO: 2 %
BCS RECOMMENDATION EXT: NORMAL
BILIRUB SERPL-MCNC: 0.5 MG/DL (ref 0.2–1.2)
BUN SERPL-MCNC: 13 MG/DL (ref 7–25)
BUN/CREAT SERPL: NORMAL (CALC) (ref 6–22)
CALCIUM SERPL-MCNC: 8.9 MG/DL (ref 8.6–10.4)
CHLORIDE SERPL-SCNC: 104 MMOL/L (ref 98–110)
CHOLEST SERPL-MCNC: 242 MG/DL
CHOLEST/HDLC SERPL: 3.1 (CALC)
CO2 SERPL-SCNC: 29 MMOL/L (ref 20–32)
CREAT SERPL-MCNC: 0.68 MG/DL (ref 0.5–1.05)
EGFR: 98 ML/MIN/1.73M2
EOSINOPHIL # BLD AUTO: 119 CELLS/UL (ref 15–500)
EOSINOPHIL NFR BLD AUTO: 3.4 %
ERYTHROCYTE [DISTWIDTH] IN BLOOD BY AUTOMATED COUNT: 12.4 % (ref 11–15)
GLOBULIN SER CALC-MCNC: 1.9 G/DL (CALC) (ref 1.9–3.7)
GLUCOSE SERPL-MCNC: 86 MG/DL (ref 65–99)
HCT VFR BLD AUTO: 41.2 % (ref 35–45)
HCV AB S/CO SERPL IA: 0.01
HCV AB SERPL QL IA: NORMAL
HDLC SERPL-MCNC: 78 MG/DL
HGB BLD-MCNC: 13.5 G/DL (ref 11.7–15.5)
HIV 1+2 AB+HIV1 P24 AG SERPL QL IA: NORMAL
LDLC SERPL CALC-MCNC: 147 MG/DL (CALC)
LYMPHOCYTES # BLD AUTO: 1502 CELLS/UL (ref 850–3900)
LYMPHOCYTES NFR BLD AUTO: 42.9 %
MCH RBC QN AUTO: 28.2 PG (ref 27–33)
MCHC RBC AUTO-ENTMCNC: 32.8 G/DL (ref 32–36)
MCV RBC AUTO: 86 FL (ref 80–100)
MONOCYTES # BLD AUTO: 371 CELLS/UL (ref 200–950)
MONOCYTES NFR BLD AUTO: 10.6 %
NEUTROPHILS # BLD AUTO: 1439 CELLS/UL (ref 1500–7800)
NEUTROPHILS NFR BLD AUTO: 41.1 %
NONHDLC SERPL-MCNC: 164 MG/DL (CALC)
PLATELET # BLD AUTO: 207 THOUSAND/UL (ref 140–400)
PMV BLD REES-ECKER: 10.7 FL (ref 7.5–12.5)
POTASSIUM SERPL-SCNC: 4.5 MMOL/L (ref 3.5–5.3)
PROT SERPL-MCNC: 6.3 G/DL (ref 6.1–8.1)
RBC # BLD AUTO: 4.79 MILLION/UL (ref 3.8–5.1)
SODIUM SERPL-SCNC: 139 MMOL/L (ref 135–146)
T4 FREE SERPL-MCNC: 1.5 NG/DL (ref 0.8–1.8)
TRIGL SERPL-MCNC: 70 MG/DL
TSH SERPL-ACNC: 1.79 MIU/L (ref 0.4–4.5)
WBC # BLD AUTO: 3.5 THOUSAND/UL (ref 3.8–10.8)

## 2022-08-02 NOTE — PROGRESS NOTES
Labs look good.  Keep an eye on cholesterol in diet.  Levels slightly high but not to the point of needing medications yet

## 2022-08-04 ENCOUNTER — PATIENT MESSAGE (OUTPATIENT)
Dept: ADMINISTRATIVE | Facility: HOSPITAL | Age: 62
End: 2022-08-04
Payer: COMMERCIAL

## 2022-08-06 LAB — NONINV COLON CA DNA+OCC BLD SCRN STL QL: NEGATIVE

## 2022-08-24 ENCOUNTER — PATIENT MESSAGE (OUTPATIENT)
Dept: ADMINISTRATIVE | Facility: HOSPITAL | Age: 62
End: 2022-08-24
Payer: COMMERCIAL

## 2022-08-29 ENCOUNTER — PATIENT OUTREACH (OUTPATIENT)
Dept: ADMINISTRATIVE | Facility: HOSPITAL | Age: 62
End: 2022-08-29
Payer: COMMERCIAL

## 2022-09-09 ENCOUNTER — TELEPHONE (OUTPATIENT)
Dept: OBSTETRICS AND GYNECOLOGY | Facility: CLINIC | Age: 62
End: 2022-09-09

## 2022-11-16 ENCOUNTER — OFFICE VISIT (OUTPATIENT)
Dept: OBSTETRICS AND GYNECOLOGY | Facility: CLINIC | Age: 62
End: 2022-11-16
Attending: OBSTETRICS & GYNECOLOGY
Payer: COMMERCIAL

## 2022-11-16 VITALS
SYSTOLIC BLOOD PRESSURE: 124 MMHG | HEIGHT: 61 IN | DIASTOLIC BLOOD PRESSURE: 88 MMHG | WEIGHT: 125.25 LBS | BODY MASS INDEX: 23.65 KG/M2

## 2022-11-16 DIAGNOSIS — N95.2 VAGINAL ATROPHY: ICD-10-CM

## 2022-11-16 DIAGNOSIS — Z01.419 ENCOUNTER FOR GYNECOLOGICAL EXAMINATION: Primary | ICD-10-CM

## 2022-11-16 PROCEDURE — 3008F PR BODY MASS INDEX (BMI) DOCUMENTED: ICD-10-PCS | Mod: CPTII,S$GLB,, | Performed by: OBSTETRICS & GYNECOLOGY

## 2022-11-16 PROCEDURE — 1159F PR MEDICATION LIST DOCUMENTED IN MEDICAL RECORD: ICD-10-PCS | Mod: CPTII,S$GLB,, | Performed by: OBSTETRICS & GYNECOLOGY

## 2022-11-16 PROCEDURE — 3008F BODY MASS INDEX DOCD: CPT | Mod: CPTII,S$GLB,, | Performed by: OBSTETRICS & GYNECOLOGY

## 2022-11-16 PROCEDURE — 1160F PR REVIEW ALL MEDS BY PRESCRIBER/CLIN PHARMACIST DOCUMENTED: ICD-10-PCS | Mod: CPTII,S$GLB,, | Performed by: OBSTETRICS & GYNECOLOGY

## 2022-11-16 PROCEDURE — 99396 PR PREVENTIVE VISIT,EST,40-64: ICD-10-PCS | Mod: S$GLB,,, | Performed by: OBSTETRICS & GYNECOLOGY

## 2022-11-16 PROCEDURE — 3074F SYST BP LT 130 MM HG: CPT | Mod: CPTII,S$GLB,, | Performed by: OBSTETRICS & GYNECOLOGY

## 2022-11-16 PROCEDURE — 3079F DIAST BP 80-89 MM HG: CPT | Mod: CPTII,S$GLB,, | Performed by: OBSTETRICS & GYNECOLOGY

## 2022-11-16 PROCEDURE — 3074F PR MOST RECENT SYSTOLIC BLOOD PRESSURE < 130 MM HG: ICD-10-PCS | Mod: CPTII,S$GLB,, | Performed by: OBSTETRICS & GYNECOLOGY

## 2022-11-16 PROCEDURE — 99396 PREV VISIT EST AGE 40-64: CPT | Mod: S$GLB,,, | Performed by: OBSTETRICS & GYNECOLOGY

## 2022-11-16 PROCEDURE — 99999 PR PBB SHADOW E&M-EST. PATIENT-LVL IV: ICD-10-PCS | Mod: PBBFAC,,, | Performed by: OBSTETRICS & GYNECOLOGY

## 2022-11-16 PROCEDURE — 99999 PR PBB SHADOW E&M-EST. PATIENT-LVL IV: CPT | Mod: PBBFAC,,, | Performed by: OBSTETRICS & GYNECOLOGY

## 2022-11-16 PROCEDURE — 1159F MED LIST DOCD IN RCRD: CPT | Mod: CPTII,S$GLB,, | Performed by: OBSTETRICS & GYNECOLOGY

## 2022-11-16 PROCEDURE — 3079F PR MOST RECENT DIASTOLIC BLOOD PRESSURE 80-89 MM HG: ICD-10-PCS | Mod: CPTII,S$GLB,, | Performed by: OBSTETRICS & GYNECOLOGY

## 2022-11-16 PROCEDURE — 1160F RVW MEDS BY RX/DR IN RCRD: CPT | Mod: CPTII,S$GLB,, | Performed by: OBSTETRICS & GYNECOLOGY

## 2022-11-16 RX ORDER — PRASTERONE 6.5 MG/1
6.5 INSERT VAGINAL NIGHTLY
Qty: 28 EACH | Refills: 11 | Status: SHIPPED | OUTPATIENT
Start: 2022-11-16 | End: 2023-11-27

## 2022-11-16 NOTE — PROGRESS NOTES
"Subjective:       Patient ID: Franca Calhoun is a 61 y.o. female.    Chief Complaint:  Annual Exam (Hysterectomy; Last mm2022 birads: 2; Last DEXA: 2022 osteopenia)      History of Present Illness  - here for annual. Has painful intercourse despite using Good Clean Love lubricant and soap due to vaginal dryness. Reports that skin is dry all over. Continues to have issues with external hemorrhoids; uses Analpram.    Past Medical History:   Diagnosis Date    Age-related osteoporosis without current pathological fracture 2021    Anxiety     Fever blister     H/O mammogram 2019    Normal  (DIS)    Osteoporosis        Past Surgical History:   Procedure Laterality Date    COLONOSCOPY      Normal  (Cologard 2019, Normal)     EYE SURGERY  2000    [place] surgery    HYSTERECTOMY      TVH, OVARIES REMAIN        Family History   Problem Relation Age of Onset    Diabetes Father     Arthritis Mother         95 yrs old    Hypertension Sister     Stroke Sister     Prostate cancer Paternal Uncle     Hypertension Sister     Stroke Sister     Breast cancer Neg Hx     Ovarian cancer Neg Hx     Colon cancer Neg Hx         Social History     Socioeconomic History    Marital status:    Tobacco Use    Smoking status: Never    Smokeless tobacco: Never   Substance and Sexual Activity    Alcohol use: Yes     Comment: Social     Drug use: No    Sexual activity: Yes     Partners: Male     Birth control/protection: Partner-Vasectomy     Comment: :     TVH             Objective:     Vitals:    22 1100   BP: 124/88   Weight: 56.8 kg (125 lb 3.5 oz)   Height: 5' 1" (1.549 m)       Physical Exam:   Constitutional: She is oriented to person, place, and time. She appears well-developed and well-nourished.        Pulmonary/Chest: Right breast exhibits no mass, no nipple discharge, no skin change, no tenderness and no swelling. Left breast exhibits no mass, no nipple discharge, no skin change, no " tenderness and no swelling. Breasts are symmetrical.        Abdominal: Soft. She exhibits no distension. There is no abdominal tenderness.     Genitourinary:    Vagina normal.   There is no tenderness or lesion on the right labia. There is no tenderness or lesion on the left labia. Right adnexum displays no mass, no tenderness and no fullness. Left adnexum displays no mass, no tenderness and no fullness. Vaginal cuff normal.  No  no vaginal discharge in the vagina. Cervix is absent.Uterus is absent.    Genitourinary Comments: atrophic             Musculoskeletal: Moves all extremeties.       Neurological: She is alert and oriented to person, place, and time.     Psychiatric: She has a normal mood and affect.      Assessment/ Plan:     Orders Placed This Encounter    INTRAROSA 6.5 mg Inst       Franca was seen today for annual exam.    Diagnoses and all orders for this visit:    Encounter for gynecological examination    Vaginal atrophy  -     INTRAROSA 6.5 mg Inst; Place 6.5 mg vaginally nightly.    - discussed risks/benefits of nightly Intrarosa. Will need to continue to use lubricant for at least 12 weeks after starting it. Discussed how this may help heal her perineal tissue as well which could help the hemorrhoid situation.  - discussed using Balneol on toilet paper to help with cleansing/healing.   - patient verbalized understanding and agrees with plan.    Follow up in about 1 year (around 11/16/2023) for annual exam.    As of April 1, 2021, the Cures Act has been passed nationally. This new law requires that all doctors progress notes, lab results, pathology reports and radiology reports be released IMMEDIATELY to the patient in the patient portal. That means that the results are released to you at the EXACT same time they are released to me. Therefore, with all of the patients that I have I am not able to reply to each patient exactly when the results come in. So there will be a delay from when you see the  results to when I see them and have time to come up with a response to send you. Also I only see these results when I am on the computer at work. So if the results come in over the weekend or after 5 pm of a work day, I will not see them until the next business day. As you can tell, this is a challenge as a physician to give every patient the quick response they hope for and deserve. So please be patient!   Thanks for your understanding and patience.

## 2022-11-21 ENCOUNTER — TELEPHONE (OUTPATIENT)
Dept: OBSTETRICS AND GYNECOLOGY | Facility: CLINIC | Age: 62
End: 2022-11-21
Payer: COMMERCIAL

## 2022-11-22 NOTE — TELEPHONE ENCOUNTER
Please ask patient which vaginal hormones are covered on her plan: estradiol cream, estradiol ring, estradiol tablets, Vagifem, Imvexxy.

## 2022-11-22 NOTE — TELEPHONE ENCOUNTER
Spoke with pt. She is going to contact insurance and find out if they cover any of the listed meds or anything similar. Pt gave verbal understanding.

## 2022-11-29 ENCOUNTER — TELEPHONE (OUTPATIENT)
Dept: OBSTETRICS AND GYNECOLOGY | Facility: CLINIC | Age: 62
End: 2022-11-29
Payer: COMMERCIAL

## 2022-11-29 RX ORDER — ESTRADIOL 10 UG/1
INSERT VAGINAL
Qty: 30 TABLET | Refills: 5 | Status: SHIPPED | OUTPATIENT
Start: 2022-11-29 | End: 2023-04-25

## 2023-02-15 ENCOUNTER — TELEPHONE (OUTPATIENT)
Dept: PRIMARY CARE CLINIC | Facility: CLINIC | Age: 63
End: 2023-02-15
Payer: COMMERCIAL

## 2023-02-15 NOTE — TELEPHONE ENCOUNTER
----- Message from Giovanna Soler sent at 2/15/2023 10:05 AM CST -----  Contact: [phone]  Pt is having cataract sx 4/20/23 and is requesting a call re scheduling a pre op with Dr Toure. I did attempt to scheudle her but nothing was available until 4/27/23. Can you please assist? Thanks

## 2023-02-15 NOTE — TELEPHONE ENCOUNTER
----- Message from Ansley Irizarry sent at 2/15/2023  1:55 PM CST -----  Contact: [phone]  Pt called to speak to someone in reference to her pre-op appointment she says she was told her surgery was moved and she would need a new pre-op appointment date. Please Advise

## 2023-04-25 ENCOUNTER — OFFICE VISIT (OUTPATIENT)
Dept: PRIMARY CARE CLINIC | Facility: CLINIC | Age: 63
End: 2023-04-25
Payer: COMMERCIAL

## 2023-04-25 VITALS
WEIGHT: 117.94 LBS | OXYGEN SATURATION: 98 % | HEIGHT: 61 IN | HEART RATE: 87 BPM | SYSTOLIC BLOOD PRESSURE: 110 MMHG | BODY MASS INDEX: 22.27 KG/M2 | DIASTOLIC BLOOD PRESSURE: 74 MMHG

## 2023-04-25 DIAGNOSIS — Z01.818 PREOP EXAMINATION: Primary | ICD-10-CM

## 2023-04-25 DIAGNOSIS — Z00.00 ANNUAL PHYSICAL EXAM: ICD-10-CM

## 2023-04-25 DIAGNOSIS — H26.9 CATARACT, UNSPECIFIED CATARACT TYPE, UNSPECIFIED LATERALITY: ICD-10-CM

## 2023-04-25 PROCEDURE — 3008F PR BODY MASS INDEX (BMI) DOCUMENTED: ICD-10-PCS | Mod: CPTII,S$GLB,, | Performed by: INTERNAL MEDICINE

## 2023-04-25 PROCEDURE — 99999 PR PBB SHADOW E&M-EST. PATIENT-LVL IV: ICD-10-PCS | Mod: PBBFAC,,, | Performed by: INTERNAL MEDICINE

## 2023-04-25 PROCEDURE — 99999 PR PBB SHADOW E&M-EST. PATIENT-LVL IV: CPT | Mod: PBBFAC,,, | Performed by: INTERNAL MEDICINE

## 2023-04-25 PROCEDURE — 3078F DIAST BP <80 MM HG: CPT | Mod: CPTII,S$GLB,, | Performed by: INTERNAL MEDICINE

## 2023-04-25 PROCEDURE — 99214 PR OFFICE/OUTPT VISIT, EST, LEVL IV, 30-39 MIN: ICD-10-PCS | Mod: S$GLB,,, | Performed by: INTERNAL MEDICINE

## 2023-04-25 PROCEDURE — 99214 OFFICE O/P EST MOD 30 MIN: CPT | Mod: S$GLB,,, | Performed by: INTERNAL MEDICINE

## 2023-04-25 PROCEDURE — 1159F PR MEDICATION LIST DOCUMENTED IN MEDICAL RECORD: ICD-10-PCS | Mod: CPTII,S$GLB,, | Performed by: INTERNAL MEDICINE

## 2023-04-25 PROCEDURE — 3078F PR MOST RECENT DIASTOLIC BLOOD PRESSURE < 80 MM HG: ICD-10-PCS | Mod: CPTII,S$GLB,, | Performed by: INTERNAL MEDICINE

## 2023-04-25 PROCEDURE — 3074F PR MOST RECENT SYSTOLIC BLOOD PRESSURE < 130 MM HG: ICD-10-PCS | Mod: CPTII,S$GLB,, | Performed by: INTERNAL MEDICINE

## 2023-04-25 PROCEDURE — 3008F BODY MASS INDEX DOCD: CPT | Mod: CPTII,S$GLB,, | Performed by: INTERNAL MEDICINE

## 2023-04-25 PROCEDURE — 3074F SYST BP LT 130 MM HG: CPT | Mod: CPTII,S$GLB,, | Performed by: INTERNAL MEDICINE

## 2023-04-25 PROCEDURE — 1159F MED LIST DOCD IN RCRD: CPT | Mod: CPTII,S$GLB,, | Performed by: INTERNAL MEDICINE

## 2023-04-25 NOTE — PROGRESS NOTES
[facility] Note    Chief Complaint      Chief Complaint   Patient presents with    Pre-op Exam       History of Present Illness      Franca Calhoun is a 62 y.o. female with chronic conditions of osteoporosis, HRT who presents today for: preop exam.  Denies chest pain, shortness of breath.  Able to walk a few city blocks without chest pain and climb a flight of stairs without chest pain.        Past Medical History:  Past Medical History:   Diagnosis Date    Age-related osteoporosis without current pathological fracture 7/7/2021    Anxiety     Fever blister     H/O mammogram 01/2019    Normal  (DIS)    Osteoporosis        Past Surgical History:   has a past surgical history that includes Hysterectomy (2006); Colonoscopy (2012); and Eye surgery (2/2000).    Family History:  family history includes Arthritis in her mother; Diabetes in her father; Hypertension in her sister and sister; Prostate cancer in her paternal uncle; Stroke in her sister and sister.     Social History:  Social History     Tobacco Use    Smoking status: Never    Smokeless tobacco: Never   Substance Use Topics    Alcohol use: Yes     Comment: Social     Drug use: No       I personally reviewed all past medical, surgical, social and family history.    Review of Systems   Constitutional:  Negative for chills, fever and malaise/fatigue.   Respiratory:  Negative for shortness of breath.    Cardiovascular:  Negative for chest pain.   Gastrointestinal:  Negative for constipation, diarrhea, nausea and vomiting.   Skin:  Negative for rash.   Neurological:  Negative for weakness.   All other systems reviewed and are negative.     Medications:  Outpatient Encounter Medications as of 4/25/2023   Medication Sig Note Dispense Refill    azelastine (ASTELIN) 137 mcg (0.1 %) nasal spray PLACE 2 SPRAYS INTRANASALLY TWICE DAILY 11/10/2016: Received from: External Pharmacy  5    BIOTIN ORAL Take by mouth.       busPIRone (BUSPAR) 10 MG tablet Take 1  tablet (10 mg total) by mouth daily as needed.  90 tablet 3    cetirizine HCl (ZYRTEC ORAL) Take by mouth.       denosumab (PROLIA) 60 mg/mL Syrg Inject 1 mL (60 mg total) into the skin every 6 (six) months.  2 mL 1    EPINEPHrine (EPIPEN) 0.3 mg/0.3 mL AtIn Inject into the muscle.       law primrose/linoleic/gamoleni (PRIMROSE OIL ORAL) Take by mouth.       glucosamine-chondroit-vit C-Mn 500-400 mg capsule once a day 11/10/2016: Received from: [facility]      hydrocortisone-pramoxine (ANALPRAM-HC) 2.5-1 % Crea PLACE RECTALLY 2 (TWO) TIMES DAILY. (Patient not taking: Reported on 11/16/2022)  30 g 1    INTRAROSA 6.5 mg Inst Place 6.5 mg vaginally nightly.  28 each 11    ipratropium (ATROVENT) 0.06 % nasal spray 2 SQUIRTS INTO EACH NOSTRIL EVERY 12 HOURS 11/10/2016: Received from: External Pharmacy  5    valACYclovir (VALTREX) 500 MG tablet Take 500 mg by mouth 2 (two) times daily. 7/26/2019: FOR FEVER BLISTERS  0    [DISCONTINUED] cyanocobalamin (VITAMIN B-12) 100 MCG tablet once a day 11/10/2016: Received from: [facility]      [DISCONTINUED] estradioL (VAGIFEM) 10 mcg Tab Insert tablet per vagina qhs x 2 weeks then twice weekly.  30 tablet 5    [DISCONTINUED] multivit-min-ferrous fumarate 9 mg iron/15 mL Liqd once a day 11/10/2016: Received from: [facility]       No facility-administered encounter medications on file as of 4/25/2023.       Allergies:  Review of patient's allergies indicates:   Allergen Reactions    Meperidine Nausea And Vomiting       Health Maintenance:  Immunization History   Administered Date(s) Administered    COVID-19, MRNA, LN-S, PF (Pfizer) (Purple Cap) 02/19/2021, 03/12/2021    DTaP 07/10/2017    Influenza 11/08/2013, 11/06/2018    Influenza - Quadrivalent 11/06/2018    Tdap 07/10/2017      Health Maintenance   Topic Date Due    Mammogram  07/29/2023     "TETANUS VACCINE  07/10/2027    Lipid Panel  07/20/2027    Hepatitis C Screening  Completed        Physical Exam      Vital Signs  Pulse: 87  SpO2: 98 %  BP: 110/74  BP Location: Left arm  Patient Position: Sitting  Pain Score: 0-No pain  Height and Weight  Height: 5' 1" (154.9 cm)  Weight: 53.5 kg (117 lb 15.1 oz)  BSA (Calculated - sq m): 1.52 sq meters  BMI (Calculated): 22.3  Weight in (lb) to have BMI = 25: 132]    Physical Exam  Vitals reviewed.   Constitutional:       Appearance: She is well-developed.   HENT:      Head: Normocephalic and atraumatic.      Right Ear: External ear normal.      Left Ear: External ear normal.   Cardiovascular:      Rate and Rhythm: Normal rate and regular rhythm.      Heart sounds: Normal heart sounds. No murmur heard.  Pulmonary:      Effort: Pulmonary effort is normal.      Breath sounds: Normal breath sounds. No wheezing or rales.   Abdominal:      General: Bowel sounds are normal. There is no distension.      Palpations: Abdomen is soft.      Tenderness: There is no abdominal tenderness.        Laboratory:  CBC:  Recent Labs   Lab 07/20/22  0000   WBC 3.5 L   RBC 4.79   Hemoglobin 13.5   Hematocrit 41.2   Platelets 207   MCV 86.0   MCH 28.2   MCHC 32.8     CMP:  Recent Labs   Lab 12/30/20  1157 07/20/22  0000   Glucose 110 86   Calcium 9.8 8.9   Albumin 4.2 4.4   Total Protein 6.3 6.3   Sodium 137 139   Potassium 4.8 4.5   CO2 29 29   Chloride 101 104   BUN 12 13   ALT 20 26   AST 24 21   Total Bilirubin 0.3 0.5     URINALYSIS:       LIPIDS:  Recent Labs   Lab 07/20/22  0000   TSH 1.79   HDL 78   Cholesterol 242 H   Triglycerides 70   LDL Cholesterol 147 H   HDL/Cholesterol Ratio 3.1   Non HDL Chol. (LDL+VLDL) 164 H     TSH:  Recent Labs   Lab 07/20/22  0000   TSH 1.79     A1C:        Assessment/Plan     Franca Calhoun is a 62 y.o.female with:    1. Preop examination  2. Cataract, unspecified cataract type, unspecified laterality  Medically optimized for low risk surgery with " low cardiac risk profile.        Chronic conditions status updated as per HPI.  Other than changes above, cont current medications and maintain follow up with specialists.  No follow-ups on file.    No future appointments.    Jeff Toure MD  [facility]

## 2023-05-13 NOTE — PROGRESS NOTES
Subjective:       Patient ID: Franca Calhoun is a 59 y.o. female.    Chief Complaint:  Well Woman (hysterectomy, last mammogram wnl 20-[facility], last dexa 19-[facility], last colonoscopy 7-8 years ago wnl per pt had fit kit done this year wnl per pt )      History of Present Illness  - here for annual. No Gyn complaints.  - younger sister  suddenly of a cerebral hemorrhage a few weeks ago. Patient is still shocked. Grieving appropriately. Surrounded by family who is also devastated.    Past Medical History:   Diagnosis Date    Anxiety     Fever blister     H/O mammogram 2019    Normal  ([facility])    Osteoporosis        Past Surgical History:   Procedure Laterality Date    COLONOSCOPY      Normal  (Cologard 2019, Normal)     HYSTERECTOMY      TVH, OVARIES REMAIN         Current Outpatient Medications:     azelastine (ASTELIN) 137 mcg (0.1 %) nasal spray, PLACE 2 SPRAYS INTRANASALLY TWICE DAILY, Disp: , Rfl: 5    busPIRone (BUSPAR) 10 MG tablet, once a day, Disp: , Rfl:     cetirizine HCl (ZYRTEC ORAL), Take by mouth., Disp: , Rfl:     cyanocobalamin (VITAMIN B-12) 100 MCG tablet, once a day, Disp: , Rfl:     denosumab (PROLIA) 60 mg/mL Syrg, Inject 1 mL (60 mg total) into the skin every 6 (six) months., Disp: 2 mL, Rfl: 1    ergocalciferol, vitamin D2, (VITAMIN D ORAL), Take by mouth., Disp: , Rfl:     law primrose/linoleic/gamoleni (PRIMROSE OIL ORAL), Take by mouth., Disp: , Rfl:     glucosamine-chondroit-vit C-Mn (GLUCOSAMINE CHONDROITIN MAXSTR) 500-400 mg Cap, once a day, Disp: , Rfl:     hydrocortisone-pramoxine (ANALPRAM-HC) 2.5-1 % Crea, Place rectally 2 (two) times daily., Disp: 30 g, Rfl: 1    ipratropium (ATROVENT) 0.06 % nasal spray, 2 SQUIRTS INTO EACH NOSTRIL EVERY 12 HOURS, Disp: , Rfl: 5    multivit-minerals-ferrous fum (MULTI VITAMIN) 9 mg iron/15 mL Liqd, once a day, Disp: , Rfl:     valACYclovir (VALTREX) 500 MG tablet, Take 500 mg by mouth 2 (two) times daily., Disp: ,  Rfl: 0    Review of patient's allergies indicates:   Allergen Reactions    Meperidine Nausea And Vomiting       GYN & OB History  No LMP recorded (lmp unknown). Patient has had a hysterectomy.   Date of Last Pap: No result found    OB History    Para Term  AB Living   2 2 2     2   SAB TAB Ectopic Multiple Live Births           2      # Outcome Date GA Lbr Shailesh/2nd Weight Sex Delivery Anes PTL Lv   2 Term 80 40w0d  3.572 kg (7 lb 14 oz) F Vag-Spont   APOLINAR   1 Term 75 40w0d  3.629 kg (8 lb) M Vag-Spont   APOLINAR       Social History     Socioeconomic History    Marital status:      Spouse name: Not on file    Number of children: Not on file    Years of education: Not on file    Highest education level: Not on file   Occupational History    Not on file   Social Needs    Financial resource strain: Not on file    Food insecurity     Worry: Not on file     Inability: Not on file    Transportation needs     Medical: Not on file     Non-medical: Not on file   Tobacco Use    Smoking status: Never Smoker    Smokeless tobacco: Never Used   Substance and Sexual Activity    Alcohol use: Yes     Comment: Social     Drug use: No    Sexual activity: Yes     Partners: Male     Birth control/protection: Surgical     Comment: :     H  2006   Lifestyle    Physical activity     Days per week: Not on file     Minutes per session: Not on file    Stress: Not on file   Relationships    Social connections     Talks on phone: Not on file     Gets together: Not on file     Attends Nondenominational service: Not on file     Active member of club or organization: Not on file     Attends meetings of clubs or organizations: Not on file     Relationship status: Not on file   Other Topics Concern    Not on file   Social History Narrative    Not on file       Family History   Problem Relation Age of Onset    Diabetes Father     Hypertension Sister     Prostate cancer Paternal Uncle     Hypertension  "Sister     Stroke Sister     Breast cancer Neg Hx     Ovarian cancer Neg Hx     Colon cancer Neg Hx        Review of Systems  Review of Systems   Respiratory: Negative for shortness of breath.    Cardiovascular: Negative for chest pain and palpitations.   Gastrointestinal: Negative for blood in stool, nausea and vomiting.   Genitourinary:        - see HPI   Skin: Negative for rash and wound.   Allergic/Immunologic: Negative for immunocompromised state.   Neurological: Negative for dizziness and syncope.   Hematological: Negative for adenopathy.   Psychiatric/Behavioral: Negative for behavioral problems.        Objective:     Vitals:    08/14/20 1454   BP: 118/68   Temp: 97.2 °F (36.2 °C)   Weight: 63 kg (139 lb)   Height: 5' 1" (1.549 m)       Physical Exam:   Constitutional: She is oriented to person, place, and time. She appears well-developed and well-nourished.        Pulmonary/Chest: Right breast exhibits no mass, no nipple discharge, no skin change, no tenderness and no swelling. Left breast exhibits no mass, no nipple discharge, no skin change, no tenderness and no swelling. Breasts are symmetrical.        Abdominal: Soft. She exhibits no distension. There is no abdominal tenderness.     Genitourinary:    Vagina normal.   There is no tenderness or lesion on the right labia. There is no tenderness or lesion on the left labia. Uterus is absent. Right adnexum displays no mass, no tenderness and no fullness. Left adnexum displays no mass, no tenderness and no fullness. Vaginal cuff normal.Cervix exhibits absence. negative for vaginal discharge          Musculoskeletal: Moves all extremeties.       Neurological: She is alert and oriented to person, place, and time.     Psychiatric: She has a normal mood and affect.        Assessment/ Plan:     Orders Placed This Encounter    Mammo Digital Screening Bilat w/ Prsaanna    denosumab (PROLIA) 60 mg/mL Syrg       Franca was seen today for well woman.    Diagnoses and " all orders for this visit:    Encounter for gynecological examination    Encounter for screening mammogram for breast cancer  -     Mammo Digital Screening Bilat w/ [facility]; Future  -     Mammo Digital Screening Bilat w/ Prasanna    Osteoporosis, unspecified osteoporosis type, unspecified pathological fracture presence  -     denosumab (PROLIA) 60 mg/mL Syrg; Inject 1 mL (60 mg total) into the skin every 6 (six) months.    - offered to recommend a counselor to help with grieving process. Patient declines at present. She will let me know if she changes her mind.     Follow up in about 1 year (around 8/14/2021) for annual exam.     Patient/Caregiver provided printed discharge information.

## 2023-07-07 ENCOUNTER — PATIENT MESSAGE (OUTPATIENT)
Dept: PRIMARY CARE CLINIC | Facility: CLINIC | Age: 63
End: 2023-07-07
Payer: COMMERCIAL

## 2023-07-14 ENCOUNTER — PATIENT MESSAGE (OUTPATIENT)
Dept: PRIMARY CARE CLINIC | Facility: CLINIC | Age: 63
End: 2023-07-14
Payer: COMMERCIAL

## 2023-07-18 LAB
ALBUMIN SERPL-MCNC: 4.3 G/DL (ref 3.6–5.1)
ALBUMIN/GLOB SERPL: 2.2 (CALC) (ref 1–2.5)
ALP SERPL-CCNC: 54 U/L (ref 37–153)
ALT SERPL-CCNC: 23 U/L (ref 6–29)
AST SERPL-CCNC: 20 U/L (ref 10–35)
BASOPHILS # BLD AUTO: 71 CELLS/UL (ref 0–200)
BASOPHILS NFR BLD AUTO: 2.3 %
BILIRUB SERPL-MCNC: 0.5 MG/DL (ref 0.2–1.2)
BUN SERPL-MCNC: 13 MG/DL (ref 7–25)
BUN/CREAT SERPL: NORMAL (CALC) (ref 6–22)
CALCIUM SERPL-MCNC: 9.9 MG/DL (ref 8.6–10.4)
CHLORIDE SERPL-SCNC: 103 MMOL/L (ref 98–110)
CHOLEST SERPL-MCNC: 209 MG/DL
CHOLEST/HDLC SERPL: 2.7 (CALC)
CO2 SERPL-SCNC: 32 MMOL/L (ref 20–32)
CREAT SERPL-MCNC: 0.62 MG/DL (ref 0.5–1.05)
EGFR: 99 ML/MIN/1.73M2
EOSINOPHIL # BLD AUTO: 121 CELLS/UL (ref 15–500)
EOSINOPHIL NFR BLD AUTO: 3.9 %
ERYTHROCYTE [DISTWIDTH] IN BLOOD BY AUTOMATED COUNT: 12.3 % (ref 11–15)
GLOBULIN SER CALC-MCNC: 2 G/DL (CALC) (ref 1.9–3.7)
GLUCOSE SERPL-MCNC: 89 MG/DL (ref 65–99)
HCT VFR BLD AUTO: 41.5 % (ref 35–45)
HDLC SERPL-MCNC: 78 MG/DL
HGB BLD-MCNC: 13.4 G/DL (ref 11.7–15.5)
LDLC SERPL CALC-MCNC: 115 MG/DL (CALC)
LYMPHOCYTES # BLD AUTO: 1597 CELLS/UL (ref 850–3900)
LYMPHOCYTES NFR BLD AUTO: 51.5 %
MCH RBC QN AUTO: 28.8 PG (ref 27–33)
MCHC RBC AUTO-ENTMCNC: 32.3 G/DL (ref 32–36)
MCV RBC AUTO: 89.1 FL (ref 80–100)
MONOCYTES # BLD AUTO: 344 CELLS/UL (ref 200–950)
MONOCYTES NFR BLD AUTO: 11.1 %
NEUTROPHILS # BLD AUTO: 967 CELLS/UL (ref 1500–7800)
NEUTROPHILS NFR BLD AUTO: 31.2 %
NONHDLC SERPL-MCNC: 131 MG/DL (CALC)
PLATELET # BLD AUTO: 259 THOUSAND/UL (ref 140–400)
PMV BLD REES-ECKER: 9.8 FL (ref 7.5–12.5)
POTASSIUM SERPL-SCNC: 4.5 MMOL/L (ref 3.5–5.3)
PROT SERPL-MCNC: 6.3 G/DL (ref 6.1–8.1)
RBC # BLD AUTO: 4.66 MILLION/UL (ref 3.8–5.1)
SODIUM SERPL-SCNC: 141 MMOL/L (ref 135–146)
T4 FREE SERPL-MCNC: 1.3 NG/DL (ref 0.8–1.8)
TRIGL SERPL-MCNC: 64 MG/DL
TSH SERPL-ACNC: 1.9 MIU/L (ref 0.4–4.5)
WBC # BLD AUTO: 3.1 THOUSAND/UL (ref 3.8–10.8)

## 2023-08-14 ENCOUNTER — OFFICE VISIT (OUTPATIENT)
Dept: OBSTETRICS AND GYNECOLOGY | Facility: CLINIC | Age: 63
End: 2023-08-14
Payer: COMMERCIAL

## 2023-08-14 VITALS
BODY MASS INDEX: 21.64 KG/M2 | SYSTOLIC BLOOD PRESSURE: 132 MMHG | HEIGHT: 61 IN | DIASTOLIC BLOOD PRESSURE: 82 MMHG | WEIGHT: 114.63 LBS

## 2023-08-14 DIAGNOSIS — Z12.39 ENCOUNTER FOR SCREENING FOR MALIGNANT NEOPLASM OF BREAST, UNSPECIFIED SCREENING MODALITY: Primary | ICD-10-CM

## 2023-08-14 DIAGNOSIS — M81.0 OSTEOPOROSIS, UNSPECIFIED OSTEOPOROSIS TYPE, UNSPECIFIED PATHOLOGICAL FRACTURE PRESENCE: ICD-10-CM

## 2023-08-14 PROCEDURE — 3008F BODY MASS INDEX DOCD: CPT | Mod: CPTII,S$GLB,, | Performed by: OBSTETRICS & GYNECOLOGY

## 2023-08-14 PROCEDURE — 3079F PR MOST RECENT DIASTOLIC BLOOD PRESSURE 80-89 MM HG: ICD-10-PCS | Mod: CPTII,S$GLB,, | Performed by: OBSTETRICS & GYNECOLOGY

## 2023-08-14 PROCEDURE — 3079F DIAST BP 80-89 MM HG: CPT | Mod: CPTII,S$GLB,, | Performed by: OBSTETRICS & GYNECOLOGY

## 2023-08-14 PROCEDURE — 99999 PR PBB SHADOW E&M-EST. PATIENT-LVL IV: CPT | Mod: PBBFAC,,, | Performed by: OBSTETRICS & GYNECOLOGY

## 2023-08-14 PROCEDURE — 99999 PR PBB SHADOW E&M-EST. PATIENT-LVL IV: ICD-10-PCS | Mod: PBBFAC,,, | Performed by: OBSTETRICS & GYNECOLOGY

## 2023-08-14 PROCEDURE — 99396 PR PREVENTIVE VISIT,EST,40-64: ICD-10-PCS | Mod: S$GLB,,, | Performed by: OBSTETRICS & GYNECOLOGY

## 2023-08-14 PROCEDURE — 99396 PREV VISIT EST AGE 40-64: CPT | Mod: S$GLB,,, | Performed by: OBSTETRICS & GYNECOLOGY

## 2023-08-14 PROCEDURE — 3075F SYST BP GE 130 - 139MM HG: CPT | Mod: CPTII,S$GLB,, | Performed by: OBSTETRICS & GYNECOLOGY

## 2023-08-14 PROCEDURE — 3008F PR BODY MASS INDEX (BMI) DOCUMENTED: ICD-10-PCS | Mod: CPTII,S$GLB,, | Performed by: OBSTETRICS & GYNECOLOGY

## 2023-08-14 PROCEDURE — 3075F PR MOST RECENT SYSTOLIC BLOOD PRESS GE 130-139MM HG: ICD-10-PCS | Mod: CPTII,S$GLB,, | Performed by: OBSTETRICS & GYNECOLOGY

## 2023-08-14 PROCEDURE — 1159F MED LIST DOCD IN RCRD: CPT | Mod: CPTII,S$GLB,, | Performed by: OBSTETRICS & GYNECOLOGY

## 2023-08-14 PROCEDURE — 1159F PR MEDICATION LIST DOCUMENTED IN MEDICAL RECORD: ICD-10-PCS | Mod: CPTII,S$GLB,, | Performed by: OBSTETRICS & GYNECOLOGY

## 2023-08-14 NOTE — PROGRESS NOTES
"Subjective:       Patient ID: Franca Calhoun is a 62 y.o. female.    Chief Complaint:  Annual Exam (Last mmg 22 birads 2)      History of Present Illness  - here for annual. Doing well with Intrarosa. Needs new Prolia Rx as Rx is . Would like to have it in the office.  - reports that her hot flashes have returned. Had been mild and was getting better. No medication or history changes.  - doing well with Intrarosa.    Past Medical History:   Diagnosis Date    Age-related osteoporosis without current pathological fracture 2021    Anxiety     Fever blister     H/O mammogram 2019    Normal  ([facility])    Osteoporosis        Past Surgical History:   Procedure Laterality Date    COLONOSCOPY      Normal  (Cologard 2019, Normal)     EYE SURGERY  2000    [place] surgery    HYSTERECTOMY      TVH, OVARIES REMAIN        Family History   Problem Relation Age of Onset    Diabetes Father     Arthritis Mother         95 yrs old    Hypertension Sister     Stroke Sister     Prostate cancer Paternal Uncle     Hypertension Sister     Stroke Sister     Breast cancer Neg Hx     Ovarian cancer Neg Hx     Colon cancer Neg Hx         Social History     Socioeconomic History    Marital status:    Tobacco Use    Smoking status: Never    Smokeless tobacco: Never   Substance and Sexual Activity    Alcohol use: Yes     Comment: Social     Drug use: No    Sexual activity: Yes     Partners: Male     Birth control/protection: Partner-Vasectomy     Comment: :     TVH             Objective:     Vitals:    23 1317   BP: 132/82   BP Location: Right arm   Patient Position: Sitting   Weight: 52 kg (114 lb 10.2 oz)   Height: 5' 1" (1.549 m)       Physical Exam:   Constitutional: She is oriented to person, place, and time. She appears well-developed and well-nourished.        Pulmonary/Chest: Right breast exhibits no mass, no nipple discharge, no skin change, no tenderness and no swelling. Left breast " exhibits no mass, no nipple discharge, no skin change, no tenderness and no swelling. Breasts are symmetrical.        Abdominal: Soft. She exhibits no distension. There is no abdominal tenderness.     Genitourinary:    Vagina normal.   There is no tenderness or lesion on the right labia. There is no tenderness or lesion on the left labia. Right adnexum displays no mass, no tenderness and no fullness. Left adnexum displays no mass, no tenderness and no fullness. Vaginal cuff normal.  No  no vaginal discharge in the vagina. Cervix is absent.Uterus is absent.           Musculoskeletal: Moves all extremeties.       Neurological: She is alert and oriented to person, place, and time.     Psychiatric: She has a normal mood and affect.        Assessment/ Plan:     Orders Placed This Encounter    Mammo Digital Screening Bilat w/ Prasanna    denosumab (PROLIA) 60 mg/mL Syrg       Franca was seen today for annual exam.    Diagnoses and all orders for this visit:    Encounter for screening for malignant neoplasm of breast, unspecified screening modality  -     Mammo Digital Screening Bilat w/ Prasanna; Future  -     Mammo Digital Screening Bilat w/ Prasanna    Osteoporosis, unspecified osteoporosis type, unspecified pathological fracture presence  -     Discontinue: denosumab (PROLIA) 60 mg/mL Syrg; Inject 1 mL (60 mg total) into the skin every 6 (six) months.  -     denosumab (PROLIA) 60 mg/mL Syrg; Inject 1 mL (60 mg total) into the skin every 6 (six) months.    - wouldn't think that hormonal status would cause her to resume having hot flashes.  - patient will keep me posted.    Follow up in about 1 year (around 8/14/2024) for annual exam.    As of April 1, 2021, the Cures Act has been passed nationally. This new law requires that all doctors progress notes, lab results, pathology reports and radiology reports be released IMMEDIATELY to the patient in the patient portal. That means that the results are released to you at the EXACT same  time they are released to me. Therefore, with all of the patients that I have I am not able to reply to each patient exactly when the results come in. So there will be a delay from when you see the results to when I see them and have time to come up with a response to send you. Also I only see these results when I am on the computer at work. So if the results come in over the weekend or after 5 pm of a work day, I will not see them until the next business day. As you can tell, this is a challenge as a physician to give every patient the quick response they hope for and deserve. So please be patient!   Thanks for your understanding and patience.

## 2023-08-15 ENCOUNTER — TELEPHONE (OUTPATIENT)
Dept: PHARMACY | Facility: CLINIC | Age: 63
End: 2023-08-15
Payer: COMMERCIAL

## 2023-08-15 ENCOUNTER — SPECIALTY PHARMACY (OUTPATIENT)
Dept: PHARMACY | Facility: CLINIC | Age: 63
End: 2023-08-15
Payer: COMMERCIAL

## 2023-08-15 NOTE — TELEPHONE ENCOUNTER
Hello, this is Lizzy Garcia, clinical pharmacist with [facility] that is part of your care team.  We have begun working on your prescription that your doctor has sent us. Our next steps include:    Prolia must be billed under medical via Buy&Bill.  Routed to [facility]  Closing out referral at [facility]    Welcome call outcome: Patient/caregiver reached

## 2023-08-15 NOTE — TELEPHONE ENCOUNTER
Prolia must be billed under medical via Buy&Bill.  Pt aware to continue on billing process as previously.   Staff message sent to [facility].  Closing out referral at [facility]

## 2023-11-24 DIAGNOSIS — N95.2 VAGINAL ATROPHY: ICD-10-CM

## 2023-11-24 NOTE — TELEPHONE ENCOUNTER
Called pt to confirm refill request  Pt confirmed Rx as well as pharmacy    Previous office visit  08-14-23    No further questions  ND

## 2023-11-27 DIAGNOSIS — N95.2 VAGINAL ATROPHY: ICD-10-CM

## 2023-11-27 RX ORDER — PRASTERONE 6.5 MG/1
6.5 INSERT VAGINAL NIGHTLY
Qty: 28 EACH | Refills: 0 | Status: SHIPPED | OUTPATIENT
Start: 2023-11-27 | End: 2023-11-28 | Stop reason: SDUPTHER

## 2023-11-27 RX ORDER — PRASTERONE 6.5 MG/1
6.5 INSERT VAGINAL NIGHTLY
Qty: 28 EACH | Refills: 0 | OUTPATIENT
Start: 2023-11-27

## 2023-11-28 ENCOUNTER — TELEPHONE (OUTPATIENT)
Dept: OBSTETRICS AND GYNECOLOGY | Facility: CLINIC | Age: 63
End: 2023-11-28
Payer: COMMERCIAL

## 2023-11-28 DIAGNOSIS — N95.2 VAGINAL ATROPHY: ICD-10-CM

## 2023-11-28 RX ORDER — PRASTERONE 6.5 MG/1
6.5 INSERT VAGINAL NIGHTLY
Qty: 28 EACH | Refills: 0 | Status: SHIPPED | OUTPATIENT
Start: 2023-11-28 | End: 2024-01-11 | Stop reason: SDUPTHER

## 2023-11-28 NOTE — TELEPHONE ENCOUNTER
Insurance is requiring a PA.  She has a different insurance than last year.      Please sign rx to go to an [facility] pharmacy

## 2024-01-11 ENCOUNTER — TELEPHONE (OUTPATIENT)
Dept: OBSTETRICS AND GYNECOLOGY | Facility: CLINIC | Age: 64
End: 2024-01-11
Payer: MEDICARE

## 2024-01-11 DIAGNOSIS — N95.2 VAGINAL ATROPHY: ICD-10-CM

## 2024-01-11 DIAGNOSIS — M81.0 OSTEOPOROSIS, UNSPECIFIED OSTEOPOROSIS TYPE, UNSPECIFIED PATHOLOGICAL FRACTURE PRESENCE: Primary | ICD-10-CM

## 2024-01-11 DIAGNOSIS — Z00.00 ENCOUNTER FOR MEDICARE ANNUAL WELLNESS EXAM: ICD-10-CM

## 2024-01-11 RX ORDER — PRASTERONE 6.5 MG/1
6.5 INSERT VAGINAL NIGHTLY
Qty: 28 EACH | Refills: 8 | Status: SHIPPED | OUTPATIENT
Start: 2024-01-11

## 2024-01-11 NOTE — TELEPHONE ENCOUNTER
Pt has had new insurance since November.  Needs Prolia run thru new insurance-[employer].  It was run in August 2023 and needed to go thru medical benefits but she would like it run again to see if it can be filled with pharmacy benefits with [employer].     Pt needs refill of Intrarosa.  She has been using it nightly for the past year until recently she decreased to 2-3 times a week.  How would you like her to continue with Intrarosa?  I pended the Intrasosa.      I did not pend the Prolia yet because I don't see her [employer] uploaded, she will upload her insurance once she gets home tonight so I will send you that request after I get a message from her.

## 2024-01-18 ENCOUNTER — TELEPHONE (OUTPATIENT)
Dept: OBSTETRICS AND GYNECOLOGY | Facility: CLINIC | Age: 64
End: 2024-01-18
Payer: MEDICARE

## 2024-01-22 ENCOUNTER — TELEPHONE (OUTPATIENT)
Dept: OBSTETRICS AND GYNECOLOGY | Facility: CLINIC | Age: 64
End: 2024-01-22
Payer: MEDICARE

## 2024-01-22 DIAGNOSIS — M81.0 OSTEOPOROSIS, UNSPECIFIED OSTEOPOROSIS TYPE, UNSPECIFIED PATHOLOGICAL FRACTURE PRESENCE: Primary | ICD-10-CM

## 2024-01-22 NOTE — TELEPHONE ENCOUNTER
Pt asking if she needs labs done. Her sister needed blood work 3 weeks before her Prolia inj  last time she had prolia labs were 12/2020.  Her injection is on 1/24    Orders entered and faxed to [facility] on vets [phone]. Pt will also come  a copy.

## 2024-01-24 ENCOUNTER — CLINICAL SUPPORT (OUTPATIENT)
Dept: OBSTETRICS AND GYNECOLOGY | Facility: CLINIC | Age: 64
End: 2024-01-24
Payer: MEDICARE

## 2024-01-24 DIAGNOSIS — M81.0 OSTEOPOROSIS, UNSPECIFIED OSTEOPOROSIS TYPE, UNSPECIFIED PATHOLOGICAL FRACTURE PRESENCE: Primary | ICD-10-CM

## 2024-01-24 PROCEDURE — 99999 PR PBB SHADOW E&M-EST. PATIENT-LVL I: CPT | Mod: PBBFAC,,,

## 2024-01-24 PROCEDURE — 96372 THER/PROPH/DIAG INJ SC/IM: CPT | Mod: S$GLB,,, | Performed by: OBSTETRICS & GYNECOLOGY

## 2024-01-24 NOTE — PROGRESS NOTES
Ordering Provider: Dr. Matamoros    During visit today patient received an injection of Prolia to right arm.  Tolerated well.  Instructed pt to remain 15 minutes after injection to monitor for reactions.      Pre pain scale: none    Post pain scale: none

## 2024-01-25 LAB
1,25(OH)2D SERPL-MCNC: 37 PG/ML (ref 18–72)
1,25(OH)2D2 SERPL-MCNC: <8 PG/ML
1,25(OH)2D3 SERPL-MCNC: 37 PG/ML
ALBUMIN SERPL-MCNC: 4.8 G/DL (ref 3.6–5.1)
ALBUMIN/GLOB SERPL: 2.5 (CALC) (ref 1–2.5)
ALP SERPL-CCNC: 86 U/L (ref 37–153)
ALT SERPL-CCNC: 20 U/L (ref 6–29)
AST SERPL-CCNC: 20 U/L (ref 10–35)
BILIRUB SERPL-MCNC: 0.4 MG/DL (ref 0.2–1.2)
BUN SERPL-MCNC: 12 MG/DL (ref 7–25)
BUN/CREAT SERPL: NORMAL (CALC) (ref 6–22)
CALCIUM SERPL-MCNC: 10.2 MG/DL (ref 8.6–10.4)
CHLORIDE SERPL-SCNC: 98 MMOL/L (ref 98–110)
CO2 SERPL-SCNC: 28 MMOL/L (ref 20–32)
CREAT SERPL-MCNC: 0.58 MG/DL (ref 0.5–1.05)
EGFR: 100 ML/MIN/1.73M2
GLOBULIN SER CALC-MCNC: 1.9 G/DL (CALC) (ref 1.9–3.7)
GLUCOSE SERPL-MCNC: 83 MG/DL (ref 65–139)
MAGNESIUM SERPL-MCNC: 2 MG/DL (ref 1.5–2.5)
PHOSPHATE SERPL-MCNC: 4.6 MG/DL (ref 2.1–4.3)
POTASSIUM SERPL-SCNC: 4.2 MMOL/L (ref 3.5–5.3)
PROT SERPL-MCNC: 6.7 G/DL (ref 6.1–8.1)
SODIUM SERPL-SCNC: 136 MMOL/L (ref 135–146)

## 2024-07-22 ENCOUNTER — TELEPHONE (OUTPATIENT)
Dept: OBSTETRICS AND GYNECOLOGY | Facility: CLINIC | Age: 64
End: 2024-07-22
Payer: MEDICARE

## 2024-07-22 DIAGNOSIS — M81.0 OSTEOPOROSIS, UNSPECIFIED OSTEOPOROSIS TYPE, UNSPECIFIED PATHOLOGICAL FRACTURE PRESENCE: Primary | ICD-10-CM

## 2024-07-27 LAB — PHOSPHATE SERPL-MCNC: 4 MG/DL (ref 2.1–4.3)

## 2024-07-29 ENCOUNTER — TELEPHONE (OUTPATIENT)
Dept: OBSTETRICS AND GYNECOLOGY | Facility: CLINIC | Age: 64
End: 2024-07-29
Payer: MEDICARE

## 2024-07-29 DIAGNOSIS — Z78.0 MENOPAUSE: Primary | ICD-10-CM

## 2024-07-29 NOTE — TELEPHONE ENCOUNTER
Phosphorus is normal. Ok for Prolia. Is also due for bone density at [facility] since last one was 7/22. Fax order.

## 2024-08-01 ENCOUNTER — CLINICAL SUPPORT (OUTPATIENT)
Dept: OBSTETRICS AND GYNECOLOGY | Facility: CLINIC | Age: 64
End: 2024-08-01
Payer: MEDICARE

## 2024-08-01 DIAGNOSIS — M81.0 OSTEOPOROSIS, UNSPECIFIED OSTEOPOROSIS TYPE, UNSPECIFIED PATHOLOGICAL FRACTURE PRESENCE: Primary | ICD-10-CM

## 2024-08-01 NOTE — PROGRESS NOTES
Ordering Provider: Dr. Matamoros    During visit today patient received an injection of Prolia to left arm.  Tolerated well.  Instructed pt to remain 15 minutes after injection to monitor for reactions.      Pre pain scale: none    Post pain scale: none

## 2024-09-17 ENCOUNTER — TELEPHONE (OUTPATIENT)
Dept: OBSTETRICS AND GYNECOLOGY | Facility: CLINIC | Age: 64
End: 2024-09-17
Payer: MEDICARE

## 2024-09-17 RX ORDER — ESTRADIOL 10 UG/1
10 INSERT VAGINAL
Qty: 24 TABLET | Refills: 3 | Status: SHIPPED | OUTPATIENT
Start: 2024-09-19 | End: 2025-09-19

## 2024-09-17 NOTE — TELEPHONE ENCOUNTER
Spoke with pt, states she has been using Intrarosa for over a year & happy with it. However now she is in the Gap with her insurance, it is costing approx $120 a mnth now.    She is asking if there is an alternative she can use?

## 2024-11-20 ENCOUNTER — OFFICE VISIT (OUTPATIENT)
Dept: PRIMARY CARE CLINIC | Facility: CLINIC | Age: 64
End: 2024-11-20
Payer: MEDICARE

## 2024-11-20 VITALS
HEIGHT: 61 IN | SYSTOLIC BLOOD PRESSURE: 130 MMHG | DIASTOLIC BLOOD PRESSURE: 80 MMHG | OXYGEN SATURATION: 98 % | WEIGHT: 119.06 LBS | BODY MASS INDEX: 22.48 KG/M2 | HEART RATE: 107 BPM

## 2024-11-20 DIAGNOSIS — E78.2 HYPERLIPIDEMIA, MIXED: ICD-10-CM

## 2024-11-20 DIAGNOSIS — Z23 NEED FOR VACCINATION: ICD-10-CM

## 2024-11-20 DIAGNOSIS — F41.9 ANXIETY: ICD-10-CM

## 2024-11-20 DIAGNOSIS — D72.819 LEUKOPENIA, UNSPECIFIED TYPE: ICD-10-CM

## 2024-11-20 DIAGNOSIS — M81.0 AGE-RELATED OSTEOPOROSIS WITHOUT CURRENT PATHOLOGICAL FRACTURE: ICD-10-CM

## 2024-11-20 DIAGNOSIS — Z00.00 ANNUAL PHYSICAL EXAM: Primary | ICD-10-CM

## 2024-11-20 PROCEDURE — 90677 PCV20 VACCINE IM: CPT | Mod: S$GLB,,, | Performed by: INTERNAL MEDICINE

## 2024-11-20 PROCEDURE — G0009 ADMIN PNEUMOCOCCAL VACCINE: HCPCS | Mod: S$GLB,,, | Performed by: INTERNAL MEDICINE

## 2024-11-20 PROCEDURE — 99999 PR PBB SHADOW E&M-EST. PATIENT-LVL IV: CPT | Mod: PBBFAC,,, | Performed by: INTERNAL MEDICINE

## 2024-11-20 PROCEDURE — 99397 PER PM REEVAL EST PAT 65+ YR: CPT | Mod: S$GLB,,, | Performed by: INTERNAL MEDICINE

## 2024-11-20 PROCEDURE — 3008F BODY MASS INDEX DOCD: CPT | Mod: CPTII,S$GLB,, | Performed by: INTERNAL MEDICINE

## 2024-11-20 PROCEDURE — G0008 ADMIN INFLUENZA VIRUS VAC: HCPCS | Mod: S$GLB,,, | Performed by: INTERNAL MEDICINE

## 2024-11-20 PROCEDURE — 1159F MED LIST DOCD IN RCRD: CPT | Mod: CPTII,S$GLB,, | Performed by: INTERNAL MEDICINE

## 2024-11-20 PROCEDURE — 3075F SYST BP GE 130 - 139MM HG: CPT | Mod: CPTII,S$GLB,, | Performed by: INTERNAL MEDICINE

## 2024-11-20 PROCEDURE — 3079F DIAST BP 80-89 MM HG: CPT | Mod: CPTII,S$GLB,, | Performed by: INTERNAL MEDICINE

## 2024-11-20 PROCEDURE — 90653 IIV ADJUVANT VACCINE IM: CPT | Mod: S$GLB,,, | Performed by: INTERNAL MEDICINE

## 2024-11-20 PROCEDURE — 1126F AMNT PAIN NOTED NONE PRSNT: CPT | Mod: CPTII,S$GLB,, | Performed by: INTERNAL MEDICINE

## 2024-11-20 RX ORDER — BUSPIRONE HYDROCHLORIDE 10 MG/1
10 TABLET ORAL DAILY PRN
Qty: 90 TABLET | Refills: 3 | Status: SHIPPED | OUTPATIENT
Start: 2024-11-20

## 2024-11-20 NOTE — PROGRESS NOTES
"[facility] Note    Chief Complaint      Chief Complaint   Patient presents with    Annual Exam       History of Present Illness      History of Present Illness    CHIEF COMPLAINT:  Ms. Calhoun presents today for follow-up and to discuss anxiety medication.    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 She is hesitant about using stronger anxiety medications due to concerns about potential sedating effects. She sometimes takes half a pill to manage milder symptoms or "take the edge off." She indicates willingness to consider increasing the dosage if necessary.    DIET AND EXERCISE:  She reports cooking at home and making efforts to watch fatty foods and carbohydrate intake. She acknowledges difficulty maintaining an exercise routine, specifically mentioning challenges with increasing walking as previously discussed. She drinks water regularly, carrying a jug throughout the day, and incorporates fruits and vegetables into her diet.    PREVENTIVE CARE:  She is considering getting a flu vaccine. Her mammogram is up to date. She completed a Cologuard test in 2022 and underwent a bone density exam this year.    GASTROINTESTINAL AND URINARY HEALTH:  She reports normal bowel movements when consuming fruits and vegetables and denies any GI issues when adhering to this dietary pattern. She also reports good bladder control and denies any issues with urinary incontinence.    FAMILY MEDICAL HISTORY:  Her mother had stents placed approximately 10 years ago and is currently experiencing mobility issues, requiring the use of a walker.      ROS:  Gastrointestinal: -change in bowel habits  Genitourinary: -urinary incontinence  Psychiatric: +anxiety       Osteoporosis: Controlled on prolia " (2024 start).  DEXA due 2026.  Diet: Prepares own food mostly.  Has had success with weight watchers in the past.  Drinks plenty water.    Exercise: Needs to increase but limited by caregiver role with her mother.   Denies drinking and driving, drinking more than 4 drinks on occasion, drug use.     Flu shot discussed.  TdAP 2017.  COVID vaccine UTD.  Shingrix discussed.  Prevnar 20 discussed.   Mammogram 2024.  PAP smear completed s/p hysterectomy.    Cologuard 2022, negative.  Has had cscope with Dr. Mcneill in the past which was normal.      Assessment/Plan     Franca Calhoun is a 64 y.o.female with:    Assessment & Plan    Assessed patient's anxiety management and current buspirone regimen  Considered increasing buspirone dosage to address ongoing caregiver stress  Reviewed recent bone density scan results; determined 2-year interval for future scans  Ordered routine labs to monitor overall health status    GENERALIZED ANXIETY DISORDER:  - Discussed the non-habit forming and non-addictive nature of buspirone.  - Increased buspirone: 15 mg dose (1.5 tablets) as needed, up to 3-4 times daily if required.  - Refilled buspirone: 90 tablets with 3 refills.    IMMUNIZATION:  - Explained the variability and effectiveness of annual flu vaccines.  - Discussed the importance of pneumococcal vaccination for adults over 65.  - Administered influenza vaccine in office.  - Administered pneumococcal vaccine in office.    GENERAL HEALTH MAINTENANCE:  - Ms. Calhoun to continue current diet rich in fruits and vegetables.  - Ms. Calhoun to maintain adequate hydration.  - Ordered labs: complete blood count, kidney function, liver function, cholesterol panel.  - Ms. Calhoun to schedule labs at [facility].    AGE-RELATED OSTEOPOROSIS:  - Follow up in 2 years for bone density scan.    FOLLOW-UP:  - Contact the office if there are any concerns or questions.         1. Annual physical exam    2. Age-related osteoporosis without  current pathological fracture    3. Leukopenia, unspecified type  - CBC Auto Differential; Future  - CBC Auto Differential    4. Hyperlipidemia, mixed  - Comprehensive Metabolic Panel; Future  - Lipid Panel; Future  - Comprehensive Metabolic Panel  - Lipid Panel    5. Anxiety  - busPIRone (BUSPAR) 10 MG tablet; Take 1 tablet (10 mg total) by mouth daily as needed.  Dispense: 90 tablet; Refill: 3    6. Need for vaccination  - influenza (adjuvanted) (Fluad) 45 mcg/0.5 mL IM vaccine (> or = 64 yo) 0.5 mL  - pneumoc 20-jacob conj-dip cr(PF) (PREVNAR-20 (PF)) injection Syrg 0.5 mL      Chronic conditions status updated as per HPI.  Other than changes above, cont current medications and maintain follow up with specialists.  No follow-ups on file.    No future appointments.          Past Medical History:  Past Medical History:   Diagnosis Date    Age-related osteoporosis without current pathological fracture 7/7/2021    Anxiety     Fever blister     H/O mammogram 01/2019    Normal  (DIS)    Osteoporosis        Past Surgical History:   has a past surgical history that includes Hysterectomy (2006); Colonoscopy (2012); and Eye surgery (2/2000).    Family History:  family history includes Arthritis in her mother; Diabetes in her father; Hypertension in her sister and sister; Prostate cancer in her paternal uncle; Stroke in her sister and sister.     Social History:  Social History     Tobacco Use    Smoking status: Never    Smokeless tobacco: Never   Substance Use Topics    Alcohol use: Yes     Comment: Social     Drug use: No       Medications:  Outpatient Encounter Medications as of 11/20/2024   Medication Sig Note Dispense Refill    azelastine (ASTELIN) 137 mcg (0.1 %) nasal spray PLACE 2 SPRAYS INTRANASALLY TWICE DAILY   5    BIOTIN ORAL Take by mouth.       busPIRone (BUSPAR) 10 MG tablet Take 1 tablet (10 mg total) by mouth daily as needed.  90 tablet 3    calcium carbonate (CALCIUM 600 ORAL) Take 1 tablet by mouth Daily.        cetirizine (ZYRTEC) 10 MG tablet Take 10 mg by mouth daily as needed for Allergies.       cholecalciferol, vitamin D3, (VITAMIN D3 ORAL) Take 800 Units by mouth Daily.       denosumab (PROLIA) 60 mg/mL Syrg Inject 1 mL (60 mg total) into the skin every 6 (six) months.  2 mL 0    EPINEPHrine (EPIPEN) 0.3 mg/0.3 mL AtIn Inject into the muscle.       estradioL (VAGIFEM) 10 mcg Tab Place 1 tablet (10 mcg total) vaginally twice a week.  24 tablet 3    law primrose/linoleic/gamoleni (PRIMROSE OIL ORAL) Take by mouth. (Patient taking differently: Take 2 tablets by mouth once daily.)       glucosamine-chondroit-vit C-Mn 500-400 mg capsule once a day (Patient taking differently: 2 capsules once daily.)       hydrocortisone-pramoxine (ANALPRAM-HC) 2.5-1 % Crea PLACE RECTALLY 2 (TWO) TIMES DAILY.  30 g 1    ipratropium (ATROVENT) 0.06 % nasal spray 2 SQUIRTS INTO EACH NOSTRIL EVERY 12 HOURS   5    loratadine-pseudoephedrine 5-120 mg (CLARITIN-D 12-HOUR) 5-120 mg per tablet Take 1 tablet by mouth once daily.       multivitamin (THERAGRAN) per tablet Take 1 tablet by mouth once daily.       valACYclovir (VALTREX) 500 MG tablet Take 500 mg by mouth 2 (two) times daily. 7/26/2019: FOR FEVER BLISTERS  0    [DISCONTINUED] busPIRone (BUSPAR) 10 MG tablet Take 1 tablet (10 mg total) by mouth daily as needed.  90 tablet 3     Facility-Administered Encounter Medications as of 11/20/2024   Medication Dose Route Frequency Provider Last Rate Last Admin    denosumab (PROLIA) injection 60 mg  60 mg Subcutaneous Q6 Months Salina Matamoros MD   60 mg at 08/01/24 1556    [COMPLETED] influenza (adjuvanted) (Fluad) 45 mcg/0.5 mL IM vaccine (> or = 66 yo) 0.5 mL  0.5 mL Intramuscular 1 time in Clinic/HOD    0.5 mL at 11/20/24 0942    [COMPLETED] pneumoc 20-jacob conj-dip cr(PF) (PREVNAR-20 (PF)) injection Syrg 0.5 mL  0.5 mL Intramuscular 1 time in Clinic/HOD    0.5 mL at 11/20/24 3858       Allergies:  Review of patient's allergies  "indicates:   Allergen Reactions    Meperidine Nausea And Vomiting       Health Maintenance:  Immunization History   Administered Date(s) Administered    COVID-19, MRNA, LN-S, PF (Pfizer) (Purple Cap) 02/19/2021, 03/12/2021    DTaP 07/10/2017    Influenza 11/08/2013, 11/06/2018    Influenza - Quadrivalent 11/06/2018    Influenza - Trivalent - Fluad - Adjuvanted - PF (65 years and older 11/20/2024    Pneumococcal Conjugate - 20 Valent 11/20/2024    Tdap 07/10/2017      Health Maintenance   Topic Date Due    Shingles Vaccine (1 of 2) Never done    COVID-19 Vaccine (3 - 2024-25 season) 09/01/2024    Colorectal Cancer Screening  08/01/2025    Mammogram  09/03/2025    TETANUS VACCINE  07/10/2027    DEXA Scan  08/01/2027    Lipid Panel  11/25/2029    RSV Vaccine (Age 60+ and Pregnant patients) (1 - 1-dose 75+ series) 11/29/2033    Hepatitis C Screening  Completed    Influenza Vaccine  Completed    Pneumococcal Vaccines (Age 65+)  Completed        Physical Exam      Vital Signs  Pulse: 107  SpO2: 98 %  BP: 130/80  BP Location: Right arm  Patient Position: Sitting  Pain Score: 0-No pain  Height and Weight  Height: 5' 1" (154.9 cm)  Weight: 54 kg (119 lb 0.8 oz)  BSA (Calculated - sq m): 1.52 sq meters  BMI (Calculated): 22.5  Weight in (lb) to have BMI = 25: 132]    Physical Exam    General: No acute distress. Well-developed. Well-nourished.  Eyes: EOMI. Sclerae anicteric.  HENT: Normocephalic. Atraumatic. Nares patent. Moist oral mucosa.  Ears: Bilateral TMs clear. Bilateral EACs clear.  Cardiovascular: Regular rate. Regular rhythm. No murmurs. No rubs. No gallops. Normal S1, S2.  Respiratory: Normal respiratory effort. Clear to auscultation bilaterally. No rales. No rhonchi. No wheezing.  Abdomen: Soft. Non-tender. Non-distended. Normoactive bowel sounds. No palpable abnormalities in internal organs.  Musculoskeletal: No  obvious deformity.  Extremities: No lower extremity edema.  Neurological: Alert & oriented x3. No " slurred speech. Normal gait.  Psychiatric: Normal mood. Normal affect. Good insight. Good judgment.  Skin: Warm. Dry. No rash.         Physical Exam  Vitals reviewed.   Constitutional:       Appearance: She is well-developed.   HENT:      Head: Normocephalic and atraumatic.      Right Ear: External ear normal.      Left Ear: External ear normal.   Cardiovascular:      Rate and Rhythm: Normal rate and regular rhythm.      Heart sounds: Normal heart sounds. No murmur heard.  Pulmonary:      Effort: Pulmonary effort is normal.      Breath sounds: Normal breath sounds. No wheezing or rales.   Abdominal:      General: Bowel sounds are normal. There is no distension.      Palpations: Abdomen is soft.      Tenderness: There is no abdominal tenderness.         Laboratory:    Results              CBC:  Recent Labs   Lab 07/20/22  0000 07/17/23  0856 11/25/24  0941   WBC 3.5 L 3.1 L 4.0   RBC 4.79 4.66 4.82   Hemoglobin 13.5 13.4 14.1   Hematocrit 41.2 41.5 42.7   Platelets 207 259 286   MCV 86.0 89.1 88.6   MCH 28.2 28.8 29.3   MCHC 32.8 32.3 33.0     CMP:  Recent Labs   Lab 07/17/23  0856 01/22/24  1314 11/25/24  0941   Glucose 89 83 81   Calcium 9.9 10.2 9.2   Albumin 4.3 4.8 4.7   Total Protein 6.3 6.7 6.5   Sodium 141 136 135   Potassium 4.5 4.2 4.7   CO2 32 28 24   Chloride 103 98 98   BUN 13 12 11   ALT 23 20 16   AST 20 20 19   Total Bilirubin 0.5 0.4 0.5     URINALYSIS:       LIPIDS:  Recent Labs   Lab 07/20/22  0000 07/17/23  0856 11/25/24  0941   TSH 1.79 1.90  --    HDL 78 78 86   Cholesterol 242 H 209 H 209 H   Triglycerides 70 64 52   LDL Cholesterol 147 H 115 H 110 H   HDL/Cholesterol Ratio 3.1 2.7 2.4   Non HDL Chol. (LDL+VLDL) 164 H 131 H 123     TSH:  Recent Labs   Lab 07/20/22  0000 07/17/23  0856   TSH 1.79 1.90     A1C:            This note was generated with the assistance of ambient listening technology. Verbal consent was obtained by the patient and accompanying visitor(s) for the recording of patient  appointment to facilitate this note. I attest to having reviewed and edited the generated note for accuracy, though some syntax or spelling errors may persist. Please contact the author of this note for any clarification.      Jeff Toure MD  [facility]

## 2024-11-26 LAB
ALBUMIN SERPL-MCNC: 4.7 G/DL (ref 3.6–5.1)
ALBUMIN/GLOB SERPL: 2.6 (CALC) (ref 1–2.5)
ALP SERPL-CCNC: 36 U/L (ref 37–153)
ALT SERPL-CCNC: 16 U/L (ref 6–29)
AST SERPL-CCNC: 19 U/L (ref 10–35)
BASOPHILS # BLD AUTO: 48 CELLS/UL (ref 0–200)
BASOPHILS NFR BLD AUTO: 1.2 %
BILIRUB SERPL-MCNC: 0.5 MG/DL (ref 0.2–1.2)
BUN SERPL-MCNC: 11 MG/DL (ref 7–25)
BUN/CREAT SERPL: ABNORMAL (CALC) (ref 6–22)
CALCIUM SERPL-MCNC: 9.2 MG/DL (ref 8.6–10.4)
CHLORIDE SERPL-SCNC: 98 MMOL/L (ref 98–110)
CHOLEST SERPL-MCNC: 209 MG/DL
CHOLEST/HDLC SERPL: 2.4 (CALC)
CO2 SERPL-SCNC: 24 MMOL/L (ref 20–32)
CREAT SERPL-MCNC: 0.69 MG/DL (ref 0.5–1.05)
EGFR: 96 ML/MIN/1.73M2
EOSINOPHIL # BLD AUTO: 68 CELLS/UL (ref 15–500)
EOSINOPHIL NFR BLD AUTO: 1.7 %
ERYTHROCYTE [DISTWIDTH] IN BLOOD BY AUTOMATED COUNT: 12.4 % (ref 11–15)
GLOBULIN SER CALC-MCNC: 1.8 G/DL (CALC) (ref 1.9–3.7)
GLUCOSE SERPL-MCNC: 81 MG/DL (ref 65–99)
HCT VFR BLD AUTO: 42.7 % (ref 35–45)
HDLC SERPL-MCNC: 86 MG/DL
HGB BLD-MCNC: 14.1 G/DL (ref 11.7–15.5)
LDLC SERPL CALC-MCNC: 110 MG/DL (CALC)
LYMPHOCYTES # BLD AUTO: 1328 CELLS/UL (ref 850–3900)
LYMPHOCYTES NFR BLD AUTO: 33.2 %
MCH RBC QN AUTO: 29.3 PG (ref 27–33)
MCHC RBC AUTO-ENTMCNC: 33 G/DL (ref 32–36)
MCV RBC AUTO: 88.6 FL (ref 80–100)
MONOCYTES # BLD AUTO: 388 CELLS/UL (ref 200–950)
MONOCYTES NFR BLD AUTO: 9.7 %
NEUTROPHILS # BLD AUTO: 2168 CELLS/UL (ref 1500–7800)
NEUTROPHILS NFR BLD AUTO: 54.2 %
NONHDLC SERPL-MCNC: 123 MG/DL (CALC)
PLATELET # BLD AUTO: 286 THOUSAND/UL (ref 140–400)
PMV BLD REES-ECKER: 9.4 FL (ref 7.5–12.5)
POTASSIUM SERPL-SCNC: 4.7 MMOL/L (ref 3.5–5.3)
PROT SERPL-MCNC: 6.5 G/DL (ref 6.1–8.1)
RBC # BLD AUTO: 4.82 MILLION/UL (ref 3.8–5.1)
SODIUM SERPL-SCNC: 135 MMOL/L (ref 135–146)
TRIGL SERPL-MCNC: 52 MG/DL
WBC # BLD AUTO: 4 THOUSAND/UL (ref 3.8–10.8)

## 2025-01-10 DIAGNOSIS — M81.0 OSTEOPOROSIS, UNSPECIFIED OSTEOPOROSIS TYPE, UNSPECIFIED PATHOLOGICAL FRACTURE PRESENCE: ICD-10-CM

## 2025-01-13 RX ORDER — DENOSUMAB 60 MG/ML
60 INJECTION SUBCUTANEOUS
Qty: 2 ML | Refills: 0 | Status: ACTIVE | OUTPATIENT
Start: 2025-01-13 | End: 2025-01-29

## 2025-01-28 ENCOUNTER — TELEPHONE (OUTPATIENT)
Dept: OBSTETRICS AND GYNECOLOGY | Facility: CLINIC | Age: 65
End: 2025-01-28
Payer: MEDICARE

## 2025-01-28 NOTE — TELEPHONE ENCOUNTER
Spoke with pt, she will call insurance to see what they do cover in comparison to estradiol.     Pts insurance no longer covers prolia. Wants to know other options. She is also going to ask what they cover for that as well.

## 2025-01-28 NOTE — TELEPHONE ENCOUNTER
Reviewed chart. I don't see anything other than Prolia for treatment in last several years. Did she ever try oral bisphosphonates? Need to know that. Would like her to get back with us when she finds out from insurance what is covered (estradiol and osteoporosis treatments).

## 2025-01-29 NOTE — TELEPHONE ENCOUNTER
"Spoke with pt.  She thinks she tried "actonel" which was an oral medication about 8 years ago.  She knows she was never on Fosamax because her mother was on that.  I looked in her [facility] chart and didn't see an "Actonel" but she said it was rx'd by us.  She wasn't sure if that was really the name of it or maybe something similar.      Has not called insurance co yet.   "

## 2025-01-30 ENCOUNTER — TELEPHONE (OUTPATIENT)
Dept: OBSTETRICS AND GYNECOLOGY | Facility: CLINIC | Age: 65
End: 2025-01-30
Payer: MEDICARE

## 2025-01-30 NOTE — TELEPHONE ENCOUNTER
Pt calling back. Spoke to insurance co    Fosamax is not covered but generic is covered.  I did not pend because I wasn't sure what dose you wanted of the Alendronate.     Estradiol/vagifem tablets need to go through [facility]. 90 day supply which I did pend

## 2025-01-31 RX ORDER — ALENDRONATE SODIUM 70 MG/1
70 TABLET ORAL
Qty: 12 TABLET | Refills: 3 | Status: SHIPPED | OUTPATIENT
Start: 2025-01-31 | End: 2026-01-31

## 2025-01-31 RX ORDER — ESTRADIOL 10 UG/1
10 INSERT VAGINAL
Qty: 24 TABLET | Refills: 3 | Status: SHIPPED | OUTPATIENT
Start: 2025-02-03 | End: 2026-02-03

## 2025-02-22 DIAGNOSIS — Z00.00 ENCOUNTER FOR MEDICARE ANNUAL WELLNESS EXAM: ICD-10-CM

## 2025-03-20 ENCOUNTER — TELEPHONE (OUTPATIENT)
Dept: OBSTETRICS AND GYNECOLOGY | Facility: CLINIC | Age: 65
End: 2025-03-20
Payer: MEDICARE

## 2025-03-20 NOTE — TELEPHONE ENCOUNTER
Pt states for the past 2 weeks she has had heaviness and fullness in left breast and now has started with a burning and pain.  Right breast is also starting to have same symptoms.  No lumps palpated, skin changes or nipple discharge.  Consumes a large cup of coffee in AM and a 16oz diet coke in the afternoons.  Aware excessive caffeine consumption can cause breast pain.  Suggested decreasing to 1 serving daily.  Scheduled visit with Radha for breast exam.

## 2025-03-26 ENCOUNTER — OFFICE VISIT (OUTPATIENT)
Dept: OBSTETRICS AND GYNECOLOGY | Facility: CLINIC | Age: 65
End: 2025-03-26
Payer: MEDICARE

## 2025-03-26 ENCOUNTER — PATIENT MESSAGE (OUTPATIENT)
Dept: OBSTETRICS AND GYNECOLOGY | Facility: CLINIC | Age: 65
End: 2025-03-26

## 2025-03-26 VITALS
WEIGHT: 121.25 LBS | DIASTOLIC BLOOD PRESSURE: 80 MMHG | BODY MASS INDEX: 22.91 KG/M2 | SYSTOLIC BLOOD PRESSURE: 130 MMHG

## 2025-03-26 DIAGNOSIS — N64.4 BILATERAL MASTODYNIA: Primary | ICD-10-CM

## 2025-03-26 PROCEDURE — 3075F SYST BP GE 130 - 139MM HG: CPT | Mod: CPTII,S$GLB,, | Performed by: NURSE PRACTITIONER

## 2025-03-26 PROCEDURE — 3008F BODY MASS INDEX DOCD: CPT | Mod: CPTII,S$GLB,, | Performed by: NURSE PRACTITIONER

## 2025-03-26 PROCEDURE — 3079F DIAST BP 80-89 MM HG: CPT | Mod: CPTII,S$GLB,, | Performed by: NURSE PRACTITIONER

## 2025-03-26 PROCEDURE — 3288F FALL RISK ASSESSMENT DOCD: CPT | Mod: CPTII,S$GLB,, | Performed by: NURSE PRACTITIONER

## 2025-03-26 PROCEDURE — 99213 OFFICE O/P EST LOW 20 MIN: CPT | Mod: S$GLB,,, | Performed by: NURSE PRACTITIONER

## 2025-03-26 PROCEDURE — 1159F MED LIST DOCD IN RCRD: CPT | Mod: CPTII,S$GLB,, | Performed by: NURSE PRACTITIONER

## 2025-03-26 PROCEDURE — 1160F RVW MEDS BY RX/DR IN RCRD: CPT | Mod: CPTII,S$GLB,, | Performed by: NURSE PRACTITIONER

## 2025-03-26 PROCEDURE — 1101F PT FALLS ASSESS-DOCD LE1/YR: CPT | Mod: CPTII,S$GLB,, | Performed by: NURSE PRACTITIONER

## 2025-03-26 PROCEDURE — 99999 PR PBB SHADOW E&M-EST. PATIENT-LVL III: CPT | Mod: PBBFAC,,, | Performed by: NURSE PRACTITIONER

## 2025-03-26 NOTE — PROGRESS NOTES
"CC: Breast Pain    Franca Calhoun  complains of bilateral breast pain for 2 weeks. Initially to left breast and now both. Tender to touch, describes as burning pain. Recently switched from Intrarosa to Vagifem vaginal inserts. Major side effects when attempted use of systemic HRT in the past.     ROS:  GENERAL: No chills, fatigability or weight loss.  NEUROLOGICAL: No headaches. No vision changes.  CARDIOVASCULAR: No chest pain. No shortness of breath. No leg cramps.  ABDOMEN: No abdominal pain. Denies nausea. Denies vomiting. No diarrhea. No constipation  BREAST: See HPI  URINARY: No incontinence, no nocturia, no frequency and no dysuria.  VULVAR: No pain, no lesions and no itching.  VAGINA: No relaxation, no itching, no discharge, no abnormal bleeding and no lesions.      Vitals:    25 0856   BP: 130/80     GENERAL: healthy, alert, no distress  Neck: neck supply, no thryomegaly  LYMPH: No epitrochlear, supraclavicular, or axillary lymphadenopathy  BREAST: inspection negative, no nipple discharge or bleeding, no masses or nodularity palpable  ABDOMEN: no masses, hepatosplenomegaly, hernias    Franca Sorenson" was seen today for breast pain.    Diagnoses and all orders for this visit:    Bilateral mastodynia    Normal breast examination. Discussed vaginal Estradiol may be causing breast tenderness, DIM supplement daily recommended for improved metabolism of Estradiol. She plans to notify if symptoms are persistent.    FU with NP as needed.       Radha Herrera, MADI-C      "

## 2025-04-22 ENCOUNTER — PATIENT MESSAGE (OUTPATIENT)
Dept: OBSTETRICS AND GYNECOLOGY | Facility: CLINIC | Age: 65
End: 2025-04-22
Payer: MEDICARE

## 2025-04-24 DIAGNOSIS — F41.9 ANXIETY: ICD-10-CM

## 2025-04-24 RX ORDER — BUSPIRONE HYDROCHLORIDE 10 MG/1
10 TABLET ORAL DAILY PRN
Qty: 90 TABLET | Refills: 0 | OUTPATIENT
Start: 2025-04-24

## 2025-05-01 DIAGNOSIS — F41.9 ANXIETY: ICD-10-CM

## 2025-05-01 DIAGNOSIS — N95.1 MENOPAUSAL VAGINAL DRYNESS: Primary | ICD-10-CM

## 2025-05-01 RX ORDER — BUSPIRONE HYDROCHLORIDE 10 MG/1
10 TABLET ORAL DAILY PRN
Qty: 90 TABLET | Refills: 3 | OUTPATIENT
Start: 2025-05-01

## 2025-05-01 RX ORDER — ESTRADIOL 0.1 MG/G
1 CREAM VAGINAL
Qty: 42.5 G | Refills: 1 | Status: ACTIVE | OUTPATIENT
Start: 2025-05-01 | End: 2026-05-01

## 2025-08-06 ENCOUNTER — LAB VISIT (OUTPATIENT)
Dept: LAB | Facility: HOSPITAL | Age: 65
End: 2025-08-06
Attending: OBSTETRICS & GYNECOLOGY
Payer: MEDICARE

## 2025-08-06 ENCOUNTER — TELEPHONE (OUTPATIENT)
Dept: OBSTETRICS AND GYNECOLOGY | Facility: CLINIC | Age: 65
End: 2025-08-06
Payer: MEDICARE

## 2025-08-06 DIAGNOSIS — R39.89 SUSPECTED UTI: Primary | ICD-10-CM

## 2025-08-06 DIAGNOSIS — R39.89 SUSPECTED UTI: ICD-10-CM

## 2025-08-06 PROCEDURE — 87077 CULTURE AEROBIC IDENTIFY: CPT | Mod: HCNC

## 2025-08-06 RX ORDER — NITROFURANTOIN 25; 75 MG/1; MG/1
100 CAPSULE ORAL 2 TIMES DAILY
Qty: 14 CAPSULE | Refills: 0 | Status: SHIPPED | OUTPATIENT
Start: 2025-08-06 | End: 2025-08-13

## 2025-08-06 NOTE — TELEPHONE ENCOUNTER
Dr Matamoros pt calling, states she is not sure if she has a bladder infection or UTI, would like discuss. Pt # [phone]     8/6/25 @ 1003 Returned pt's call. No answer left message. Will also send a portal message.

## 2025-08-08 LAB — BACTERIA UR CULT: ABNORMAL

## 2025-08-11 ENCOUNTER — RESULTS FOLLOW-UP (OUTPATIENT)
Dept: OBSTETRICS AND GYNECOLOGY | Facility: CLINIC | Age: 65
End: 2025-08-11
Payer: MEDICARE